# Patient Record
Sex: FEMALE | Race: WHITE | NOT HISPANIC OR LATINO | Employment: FULL TIME | URBAN - NONMETROPOLITAN AREA
[De-identification: names, ages, dates, MRNs, and addresses within clinical notes are randomized per-mention and may not be internally consistent; named-entity substitution may affect disease eponyms.]

---

## 2018-01-23 ENCOUNTER — HISTORICAL (OUTPATIENT)
Dept: ADMINISTRATIVE | Facility: HOSPITAL | Age: 53
End: 2018-01-23

## 2018-04-04 ENCOUNTER — HISTORICAL (OUTPATIENT)
Dept: ADMINISTRATIVE | Facility: HOSPITAL | Age: 53
End: 2018-04-04

## 2018-04-13 ENCOUNTER — HISTORICAL (OUTPATIENT)
Dept: ADMINISTRATIVE | Facility: HOSPITAL | Age: 53
End: 2018-04-13

## 2018-07-11 LAB — HCV AB SER-ACNC: NEGATIVE

## 2018-09-25 ENCOUNTER — HISTORICAL (OUTPATIENT)
Dept: ADMINISTRATIVE | Facility: HOSPITAL | Age: 53
End: 2018-09-25

## 2019-02-26 ENCOUNTER — HISTORICAL (OUTPATIENT)
Dept: ADMINISTRATIVE | Facility: HOSPITAL | Age: 54
End: 2019-02-26

## 2019-02-27 ENCOUNTER — HISTORICAL (OUTPATIENT)
Dept: ADMINISTRATIVE | Facility: HOSPITAL | Age: 54
End: 2019-02-27

## 2019-06-04 ENCOUNTER — HISTORICAL (OUTPATIENT)
Dept: ADMINISTRATIVE | Facility: HOSPITAL | Age: 54
End: 2019-06-04

## 2020-01-23 LAB
INFLUENZA A ANTIGEN, POC: POSITIVE
INFLUENZA B ANTIGEN, POC: NEGATIVE

## 2020-04-16 LAB
BILIRUB SERPL-MCNC: NEGATIVE MG/DL
BLOOD URINE, POC: NORMAL
CLARITY, POC UA: NORMAL
COLOR, POC UA: YELLOW
GLUCOSE UR QL STRIP: NEGATIVE
KETONES UR QL STRIP: NEGATIVE
LEUKOCYTE EST, POC UA: NORMAL
NITRITE, POC UA: NEGATIVE
PH, POC UA: 5.5
PROTEIN, POC: NORMAL
SPECIFIC GRAVITY, POC UA: NORMAL
UROBILINOGEN, POC UA: NORMAL

## 2020-04-30 ENCOUNTER — HISTORICAL (OUTPATIENT)
Dept: ADMINISTRATIVE | Facility: HOSPITAL | Age: 55
End: 2020-04-30

## 2020-04-30 LAB
BILIRUB SERPL-MCNC: NEGATIVE MG/DL
BLOOD URINE, POC: NEGATIVE
CLARITY, POC UA: NORMAL
COLOR, POC UA: YELLOW
GLUCOSE UR QL STRIP: NEGATIVE
KETONES UR QL STRIP: NEGATIVE
LEUKOCYTE EST, POC UA: NEGATIVE
NITRITE, POC UA: NEGATIVE
PH, POC UA: 5
PROTEIN, POC: NEGATIVE
SPECIFIC GRAVITY, POC UA: 1.01
UROBILINOGEN, POC UA: NORMAL

## 2020-06-16 ENCOUNTER — HISTORICAL (OUTPATIENT)
Dept: ADMINISTRATIVE | Facility: HOSPITAL | Age: 55
End: 2020-06-16

## 2020-06-24 ENCOUNTER — HISTORICAL (OUTPATIENT)
Dept: ADMINISTRATIVE | Facility: HOSPITAL | Age: 55
End: 2020-06-24

## 2020-10-06 ENCOUNTER — HISTORICAL (OUTPATIENT)
Dept: ADMINISTRATIVE | Facility: HOSPITAL | Age: 55
End: 2020-10-06

## 2020-10-06 LAB
ALBUMIN SERPL-MCNC: 4.8 G/DL (ref 3.8–4.9)
ALBUMIN/GLOB SERPL: 2 {RATIO} (ref 1.2–2.2)
ALP SERPL-CCNC: 86 IU/L (ref 39–117)
ALT SERPL-CCNC: 16 IU/L (ref 0–32)
AST SERPL-CCNC: 15 IU/L (ref 0–40)
BASOPHILS # BLD AUTO: 0 X10E3/UL (ref 0–0.2)
BASOPHILS NFR BLD AUTO: 1 %
BILIRUB SERPL-MCNC: 0.3 MG/DL (ref 0–1.2)
BUN SERPL-MCNC: 15 MG/DL (ref 6–24)
CALCIUM SERPL-MCNC: 9.6 MG/DL (ref 8.7–10.2)
CHLORIDE SERPL-SCNC: 103 MMOL/L (ref 96–106)
CHOLEST SERPL-MCNC: 174 MG/DL (ref 100–199)
CHOLEST/HDLC SERPL: 2.7 RATIO (ref 0–4.4)
CO2 SERPL-SCNC: 20 MMOL/L (ref 20–29)
CREAT SERPL-MCNC: 0.82 MG/DL (ref 0.57–1)
CREAT/UREA NIT SERPL: 18 (ref 9–23)
DEPRECATED CALCIDIOL+CALCIFEROL SERPL-MC: 47 NG/ML (ref 30–100)
EOSINOPHIL # BLD AUTO: 0.2 X10E3/UL (ref 0–0.4)
EOSINOPHIL NFR BLD AUTO: 3 %
ERYTHROCYTE [DISTWIDTH] IN BLOOD BY AUTOMATED COUNT: 12.8 % (ref 11.7–15.4)
GLOBULIN SER-MCNC: 2.4 G/DL (ref 1.5–4.5)
GLUCOSE SERPL-MCNC: 107 MG/DL (ref 65–99)
HBA1C MFR BLD: 5 % (ref 4.8–5.6)
HCT VFR BLD AUTO: 38 % (ref 34–46.6)
HDLC SERPL-MCNC: 64 MG/DL
HGB BLD-MCNC: 12.2 G/DL (ref 11.1–15.9)
LDLC SERPL CALC-MCNC: 71 MG/DL (ref 0–99)
LYMPHOCYTES # BLD AUTO: 1.3 X10E3/UL (ref 0.7–3.1)
LYMPHOCYTES NFR BLD AUTO: 19 %
MCH RBC QN AUTO: 29 PG (ref 26.6–33)
MCHC RBC AUTO-ENTMCNC: 32.1 G/DL (ref 31.5–35.7)
MCV RBC AUTO: 90 FL (ref 79–97)
MONOCYTES # BLD AUTO: 0.4 X10E3/UL (ref 0.1–0.9)
MONOCYTES NFR BLD AUTO: 5 %
NEUTROPHILS # BLD AUTO: 5.1 X10E3/UL (ref 1.4–7)
NEUTROPHILS NFR BLD AUTO: 72 %
PLATELET # BLD AUTO: 196 X10E3/UL (ref 150–450)
POTASSIUM SERPL-SCNC: 4.8 MMOL/L (ref 3.5–5.2)
PROT SERPL-MCNC: 7.2 G/DL (ref 6–8.5)
RBC # BLD AUTO: 4.21 X10(6)/MCL (ref 3.77–5.28)
SODIUM SERPL-SCNC: 141 MMOL/L (ref 134–144)
TRIGL SERPL-MCNC: 242 MG/DL (ref 0–149)
TSH SERPL-ACNC: 1.19 MIU/ML (ref 0.45–4.5)
VLDLC SERPL CALC-MCNC: 39 MG/DL (ref 5–40)
WBC # SPEC AUTO: 7.1 X10E3/UL (ref 3.4–10.8)

## 2021-04-19 ENCOUNTER — HISTORICAL (OUTPATIENT)
Dept: ADMINISTRATIVE | Facility: HOSPITAL | Age: 56
End: 2021-04-19

## 2021-04-19 LAB
ALBUMIN SERPL-MCNC: 4.3 G/DL (ref 3.8–4.9)
ALBUMIN/GLOB SERPL: 1.7 {RATIO} (ref 1.2–2.2)
ALP SERPL-CCNC: 69 IU/L (ref 39–117)
ALT SERPL-CCNC: 14 IU/L (ref 0–32)
AST SERPL-CCNC: 13 IU/L (ref 0–40)
BASOPHILS # BLD AUTO: 0.1 X10E3/UL (ref 0–0.2)
BASOPHILS NFR BLD AUTO: 1 %
BILIRUB SERPL-MCNC: 0.2 MG/DL (ref 0–1.2)
BUN SERPL-MCNC: 15 MG/DL (ref 6–24)
CALCIUM SERPL-MCNC: 9.4 MG/DL (ref 8.7–10.2)
CHLORIDE SERPL-SCNC: 102 MMOL/L (ref 96–106)
CHOLEST SERPL-MCNC: 162 MG/DL (ref 100–199)
CHOLEST/HDLC SERPL: 2.5 RATIO (ref 0–4.4)
CO2 SERPL-SCNC: 23 MMOL/L (ref 20–29)
CREAT SERPL-MCNC: 0.9 MG/DL (ref 0.57–1)
CREAT/UREA NIT SERPL: 17 (ref 9–23)
DEPRECATED CALCIDIOL+CALCIFEROL SERPL-MC: 37.4 NG/ML (ref 30–100)
EOSINOPHIL # BLD AUTO: 0.2 X10E3/UL (ref 0–0.4)
EOSINOPHIL NFR BLD AUTO: 4 %
ERYTHROCYTE [DISTWIDTH] IN BLOOD BY AUTOMATED COUNT: 13.3 % (ref 11.7–15.4)
GLOBULIN SER-MCNC: 2.6 G/DL (ref 1.5–4.5)
GLUCOSE SERPL-MCNC: 100 MG/DL (ref 65–99)
HBA1C MFR BLD: 5.1 % (ref 4.8–5.6)
HCT VFR BLD AUTO: 37.8 % (ref 34–46.6)
HDLC SERPL-MCNC: 64 MG/DL
HGB BLD-MCNC: 12.3 G/DL (ref 11.1–15.9)
LDLC SERPL CALC-MCNC: 62 MG/DL (ref 0–99)
LYMPHOCYTES # BLD AUTO: 1.7 X10E3/UL (ref 0.7–3.1)
LYMPHOCYTES NFR BLD AUTO: 32 %
MCH RBC QN AUTO: 29 PG (ref 26.6–33)
MCHC RBC AUTO-ENTMCNC: 32.5 G/DL (ref 31.5–35.7)
MCV RBC AUTO: 89 FL (ref 79–97)
MONOCYTES # BLD AUTO: 0.3 X10E3/UL (ref 0.1–0.9)
MONOCYTES NFR BLD AUTO: 5 %
NEUTROPHILS # BLD AUTO: 3.1 X10E3/UL (ref 1.4–7)
NEUTROPHILS NFR BLD AUTO: 58 %
PLATELET # BLD AUTO: 247 X10E3/UL (ref 150–450)
POTASSIUM SERPL-SCNC: 4.4 MMOL/L (ref 3.5–5.2)
PROT SERPL-MCNC: 6.9 G/DL (ref 6–8.5)
RBC # BLD AUTO: 4.24 X10(6)/MCL (ref 3.77–5.28)
SODIUM SERPL-SCNC: 141 MMOL/L (ref 134–144)
TRIGL SERPL-MCNC: 223 MG/DL (ref 0–149)
TSH SERPL-ACNC: 4.55 MIU/ML (ref 0.45–4.5)
VLDLC SERPL CALC-MCNC: 36 MG/DL (ref 5–40)
WBC # SPEC AUTO: 5.4 X10E3/UL (ref 3.4–10.8)

## 2021-04-29 LAB
BILIRUB SERPL-MCNC: NEGATIVE MG/DL
BLOOD URINE, POC: NEGATIVE
CLARITY, POC UA: NORMAL
COLOR, POC UA: YELLOW
GLUCOSE UR QL STRIP: NEGATIVE
KETONES UR QL STRIP: NEGATIVE
LEUKOCYTE EST, POC UA: NEGATIVE
NITRITE, POC UA: NEGATIVE
PH, POC UA: 5.5
PROTEIN, POC: NEGATIVE
SPECIFIC GRAVITY, POC UA: 1.02
UROBILINOGEN, POC UA: NORMAL

## 2021-06-21 ENCOUNTER — HISTORICAL (OUTPATIENT)
Dept: ADMINISTRATIVE | Facility: HOSPITAL | Age: 56
End: 2021-06-21

## 2021-06-21 LAB — TSH SERPL-ACNC: 0.94 MIU/ML (ref 0.45–4.5)

## 2021-06-30 ENCOUNTER — HISTORICAL (OUTPATIENT)
Dept: ADMINISTRATIVE | Facility: HOSPITAL | Age: 56
End: 2021-06-30

## 2021-09-21 ENCOUNTER — HISTORICAL (OUTPATIENT)
Dept: ADMINISTRATIVE | Facility: HOSPITAL | Age: 56
End: 2021-09-21

## 2021-09-21 LAB
ALBUMIN SERPL-MCNC: 4.5 G/DL (ref 3.8–4.9)
ALBUMIN/GLOB SERPL: 1.7 {RATIO} (ref 1.2–2.2)
ALP SERPL-CCNC: 109 IU/L (ref 44–121)
ALT SERPL-CCNC: 24 IU/L (ref 0–32)
AST SERPL-CCNC: 25 IU/L (ref 0–40)
BASOPHILS # BLD AUTO: 0 X10E3/UL (ref 0–0.2)
BASOPHILS NFR BLD AUTO: 1 %
BILIRUB SERPL-MCNC: 0.3 MG/DL (ref 0–1.2)
BUN SERPL-MCNC: 9 MG/DL (ref 6–24)
CALCIUM SERPL-MCNC: 9.4 MG/DL (ref 8.7–10.2)
CHLORIDE SERPL-SCNC: 98 MMOL/L (ref 96–106)
CHOLEST SERPL-MCNC: 166 MG/DL (ref 100–199)
CHOLEST/HDLC SERPL: 4 RATIO (ref 0–4.4)
CO2 SERPL-SCNC: 24 MMOL/L (ref 20–29)
CREAT SERPL-MCNC: 0.89 MG/DL (ref 0.57–1)
CREAT/UREA NIT SERPL: 10 (ref 9–23)
DEPRECATED CALCIDIOL+CALCIFEROL SERPL-MC: 37.9 NG/ML (ref 30–100)
EOSINOPHIL # BLD AUTO: 0.1 X10E3/UL (ref 0–0.4)
EOSINOPHIL NFR BLD AUTO: 2 %
ERYTHROCYTE [DISTWIDTH] IN BLOOD BY AUTOMATED COUNT: 12.5 % (ref 11.7–15.4)
GLOBULIN SER-MCNC: 2.6 G/DL (ref 1.5–4.5)
GLUCOSE SERPL-MCNC: 119 MG/DL (ref 65–99)
HCT VFR BLD AUTO: 39.4 % (ref 34–46.6)
HDLC SERPL-MCNC: 41 MG/DL
HGB BLD-MCNC: 13.3 G/DL (ref 11.1–15.9)
LDLC SERPL CALC-MCNC: 92 MG/DL (ref 0–99)
LYMPHOCYTES # BLD AUTO: 1.1 X10E3/UL (ref 0.7–3.1)
LYMPHOCYTES NFR BLD AUTO: 25 %
MCH RBC QN AUTO: 27.9 PG (ref 26.6–33)
MCHC RBC AUTO-ENTMCNC: 33.8 G/DL (ref 31.5–35.7)
MCV RBC AUTO: 83 FL (ref 79–97)
MONOCYTES # BLD AUTO: 0.4 X10E3/UL (ref 0.1–0.9)
MONOCYTES NFR BLD AUTO: 8 %
NEUTROPHILS # BLD AUTO: 3 X10E3/UL (ref 1.4–7)
NEUTROPHILS NFR BLD AUTO: 64 %
PLATELET # BLD AUTO: 235 X10E3/UL (ref 150–450)
POTASSIUM SERPL-SCNC: 4.1 MMOL/L (ref 3.5–5.2)
PROT SERPL-MCNC: 7.1 G/DL (ref 6–8.5)
RBC # BLD AUTO: 4.77 X10(6)/MCL (ref 3.77–5.28)
SODIUM SERPL-SCNC: 137 MMOL/L (ref 134–144)
TRIGL SERPL-MCNC: 191 MG/DL (ref 0–149)
TSH SERPL-ACNC: 0.97 MIU/ML (ref 0.45–4.5)
VLDLC SERPL CALC-MCNC: 33 MG/DL (ref 5–40)
WBC # SPEC AUTO: 4.6 X10E3/UL (ref 3.4–10.8)

## 2021-12-03 ENCOUNTER — HISTORICAL (OUTPATIENT)
Dept: ADMINISTRATIVE | Facility: HOSPITAL | Age: 56
End: 2021-12-03

## 2022-02-09 LAB
BASOPHILS # BLD AUTO: 0 10*3/UL (ref 0–0.2)
BASOPHILS NFR BLD AUTO: 0 %
CHOLEST SERPL-MCNC: 224 MG/DL (ref 100–199)
CHOLEST/HDLC SERPL: 3.2 {RATIO} (ref 0–4.4)
DEPRECATED CALCIDIOL+CALCIFEROL SERPL-MC: 20.1 NG/ML (ref 30–100)
EOSINOPHIL # BLD AUTO: 0.1 10*3/UL (ref 0–0.4)
EOSINOPHIL NFR BLD AUTO: 3 %
ERYTHROCYTE [DISTWIDTH] IN BLOOD BY AUTOMATED COUNT: 13.2 % (ref 11.7–15.4)
HBA1C MFR BLD: 5.7 % (ref 4.8–5.6)
HCT VFR BLD AUTO: 36.6 % (ref 34–46.6)
HDLC SERPL-MCNC: 69 MG/DL
HGB BLD-MCNC: 11.9 G/DL (ref 11.1–15.9)
INSULIN SERPL-MCNC: 30.1 UG/L (ref 2.6–24.9)
IRON SERPL-MCNC: 67 UG/DL (ref 27–159)
LDLC SERPL CALC-MCNC: 121 MG/DL (ref 0–99)
LYMPHOCYTES # BLD AUTO: 1.5 10*3/UL (ref 0.7–3.1)
LYMPHOCYTES NFR BLD AUTO: 32 %
MCH RBC QN AUTO: 28.1 PG (ref 26.6–33)
MCHC RBC AUTO-ENTMCNC: 32.5 G/DL (ref 31.5–35.7)
MCV RBC AUTO: 86 FL (ref 79–97)
MONOCYTES # BLD AUTO: 0.3 10*3/UL (ref 0.1–0.9)
MONOCYTES NFR BLD AUTO: 7 %
NEUTROPHILS # BLD AUTO: 2.7 10*3/UL (ref 1.4–7)
NEUTROPHILS NFR BLD AUTO: 58 %
PLATELET # BLD AUTO: 257 10*3/UL (ref 150–450)
RBC # BLD AUTO: 4.24 10*6/UL (ref 3.77–5.28)
TRIGL SERPL-MCNC: 199 MG/DL (ref 0–149)
TSH SERPL-ACNC: 0.73 M[IU]/L (ref 0.45–4.5)
VLDLC SERPL CALC-MCNC: 34 MG/DL (ref 5–40)
WBC # SPEC AUTO: 4.6 10*3/UL (ref 3.4–10.8)

## 2022-03-24 ENCOUNTER — HISTORICAL (OUTPATIENT)
Dept: ADMINISTRATIVE | Facility: HOSPITAL | Age: 57
End: 2022-03-24

## 2022-03-29 ENCOUNTER — HISTORICAL (OUTPATIENT)
Dept: ADMINISTRATIVE | Facility: HOSPITAL | Age: 57
End: 2022-03-29

## 2022-04-10 ENCOUNTER — HISTORICAL (OUTPATIENT)
Dept: ADMINISTRATIVE | Facility: HOSPITAL | Age: 57
End: 2022-04-10

## 2022-04-28 VITALS
BODY MASS INDEX: 42.09 KG/M2 | WEIGHT: 252.63 LBS | DIASTOLIC BLOOD PRESSURE: 60 MMHG | SYSTOLIC BLOOD PRESSURE: 132 MMHG | OXYGEN SATURATION: 99 % | HEIGHT: 65 IN

## 2022-05-14 ENCOUNTER — HISTORICAL (OUTPATIENT)
Dept: ADMINISTRATIVE | Facility: HOSPITAL | Age: 57
End: 2022-05-14

## 2022-09-15 ENCOUNTER — HISTORICAL (OUTPATIENT)
Dept: ADMINISTRATIVE | Facility: HOSPITAL | Age: 57
End: 2022-09-15

## 2022-09-15 LAB — BCS RECOMMENDATION EXT: NORMAL

## 2022-09-21 ENCOUNTER — HISTORICAL (OUTPATIENT)
Dept: ADMINISTRATIVE | Facility: HOSPITAL | Age: 57
End: 2022-09-21

## 2023-01-11 ENCOUNTER — DOCUMENTATION ONLY (OUTPATIENT)
Dept: ADMINISTRATIVE | Facility: HOSPITAL | Age: 58
End: 2023-01-11

## 2023-01-25 ENCOUNTER — OFFICE VISIT (OUTPATIENT)
Dept: BEHAVIORAL HEALTH | Facility: CLINIC | Age: 58
End: 2023-01-25

## 2023-01-25 VITALS
DIASTOLIC BLOOD PRESSURE: 68 MMHG | WEIGHT: 215 LBS | HEART RATE: 91 BPM | TEMPERATURE: 97 F | SYSTOLIC BLOOD PRESSURE: 116 MMHG | OXYGEN SATURATION: 95 % | HEIGHT: 64 IN | BODY MASS INDEX: 36.7 KG/M2

## 2023-01-25 VITALS
SYSTOLIC BLOOD PRESSURE: 158 MMHG | BODY MASS INDEX: 39.41 KG/M2 | DIASTOLIC BLOOD PRESSURE: 88 MMHG | HEIGHT: 65 IN | WEIGHT: 236.56 LBS | OXYGEN SATURATION: 96 % | HEART RATE: 82 BPM | TEMPERATURE: 97 F

## 2023-01-25 DIAGNOSIS — F95.8 PHONIC TIC: Chronic | ICD-10-CM

## 2023-01-25 DIAGNOSIS — G25.81 RESTLESS LEG SYNDROME: ICD-10-CM

## 2023-01-25 DIAGNOSIS — F41.1 GENERALIZED ANXIETY DISORDER: Chronic | ICD-10-CM

## 2023-01-25 DIAGNOSIS — F33.1 DEPRESSION, MAJOR, RECURRENT, MODERATE: Primary | ICD-10-CM

## 2023-01-25 DIAGNOSIS — G47.00 INSOMNIA, UNSPECIFIED TYPE: Chronic | ICD-10-CM

## 2023-01-25 PROCEDURE — 99214 PR OFFICE/OUTPT VISIT, EST, LEVL IV, 30-39 MIN: ICD-10-PCS | Mod: ,,, | Performed by: NURSE PRACTITIONER

## 2023-01-25 PROCEDURE — 99214 OFFICE O/P EST MOD 30 MIN: CPT | Mod: ,,, | Performed by: NURSE PRACTITIONER

## 2023-01-25 RX ORDER — METFORMIN HYDROCHLORIDE EXTENDED-RELEASE TABLETS 500 MG/1
500 TABLET, FILM COATED, EXTENDED RELEASE ORAL
COMMUNITY
Start: 2022-11-23 | End: 2023-03-28

## 2023-01-25 RX ORDER — MOMETASONE FUROATE 1 MG/G
1 CREAM TOPICAL 2 TIMES DAILY PRN
COMMUNITY
Start: 2022-11-22

## 2023-01-25 RX ORDER — CLONIDINE HYDROCHLORIDE 0.1 MG/1
0.1 TABLET ORAL NIGHTLY
COMMUNITY
Start: 2023-01-17 | End: 2023-01-25 | Stop reason: SDUPTHER

## 2023-01-25 RX ORDER — GABAPENTIN 600 MG/1
600 TABLET ORAL 3 TIMES DAILY
COMMUNITY
Start: 2022-12-23 | End: 2023-02-07

## 2023-01-25 RX ORDER — VORTIOXETINE 20 MG/1
1 TABLET, FILM COATED ORAL DAILY
Qty: 30 TABLET | Refills: 2 | Status: SHIPPED | OUTPATIENT
Start: 2023-01-25 | End: 2023-03-30 | Stop reason: SDUPTHER

## 2023-01-25 RX ORDER — ROPINIROLE 2 MG/1
2 TABLET, FILM COATED ORAL NIGHTLY
Qty: 30 TABLET | Refills: 2 | Status: SHIPPED | OUTPATIENT
Start: 2023-01-25 | End: 2023-09-06

## 2023-01-25 RX ORDER — CLONAZEPAM 0.5 MG/1
0.5 TABLET ORAL 3 TIMES DAILY PRN
Qty: 90 TABLET | Refills: 1 | Status: SHIPPED | OUTPATIENT
Start: 2023-01-25 | End: 2023-03-30 | Stop reason: SDUPTHER

## 2023-01-25 RX ORDER — CLONAZEPAM 0.5 MG/1
0.5 TABLET ORAL 3 TIMES DAILY PRN
COMMUNITY
Start: 2022-11-14 | End: 2023-01-25 | Stop reason: SDUPTHER

## 2023-01-25 RX ORDER — MELOXICAM 15 MG/1
15 TABLET ORAL DAILY
COMMUNITY
Start: 2022-12-14 | End: 2023-08-21

## 2023-01-25 RX ORDER — CLONIDINE HYDROCHLORIDE 0.1 MG/1
0.1 TABLET ORAL NIGHTLY
Qty: 30 TABLET | Refills: 2 | Status: SHIPPED | OUTPATIENT
Start: 2023-01-25

## 2023-01-25 RX ORDER — TRAMADOL HYDROCHLORIDE 50 MG/1
50 TABLET ORAL 2 TIMES DAILY PRN
COMMUNITY
Start: 2022-12-06

## 2023-01-25 RX ORDER — MONTELUKAST SODIUM 10 MG/1
10 TABLET ORAL NIGHTLY
COMMUNITY
Start: 2022-11-30 | End: 2023-02-22

## 2023-01-25 RX ORDER — TRAZODONE HYDROCHLORIDE 150 MG/1
300 TABLET ORAL NIGHTLY
Qty: 60 TABLET | Refills: 2 | Status: SHIPPED | OUTPATIENT
Start: 2023-01-25 | End: 2023-03-30 | Stop reason: SDUPTHER

## 2023-01-25 RX ORDER — MODAFINIL 200 MG/1
1.5 TABLET ORAL DAILY
COMMUNITY
Start: 2022-09-15 | End: 2023-03-30 | Stop reason: SDUPTHER

## 2023-01-25 RX ORDER — TRAZODONE HYDROCHLORIDE 150 MG/1
300 TABLET ORAL NIGHTLY
COMMUNITY
Start: 2022-12-27 | End: 2023-01-25 | Stop reason: SDUPTHER

## 2023-01-25 RX ORDER — TRIAMCINOLONE ACETONIDE 1 MG/G
1 PASTE DENTAL 2 TIMES DAILY PRN
COMMUNITY
Start: 2022-12-09 | End: 2023-03-28

## 2023-01-25 RX ORDER — LEVOTHYROXINE SODIUM 112 UG/1
112 TABLET ORAL EVERY MORNING
COMMUNITY
Start: 2022-12-08 | End: 2023-02-27

## 2023-01-25 RX ORDER — MUPIROCIN 20 MG/G
1 OINTMENT TOPICAL 3 TIMES DAILY
COMMUNITY
Start: 2022-10-06

## 2023-01-25 RX ORDER — ERGOCALCIFEROL 1.25 MG/1
50000 CAPSULE ORAL
COMMUNITY
Start: 2022-11-25 | End: 2023-02-22

## 2023-01-25 RX ORDER — OXYCODONE AND ACETAMINOPHEN 7.5; 325 MG/1; MG/1
1 TABLET ORAL 4 TIMES DAILY PRN
COMMUNITY
Start: 2023-01-17 | End: 2023-07-06

## 2023-01-25 RX ORDER — METAXALONE 800 MG/1
800 TABLET ORAL 3 TIMES DAILY
COMMUNITY
Start: 2023-01-03

## 2023-01-25 RX ORDER — ROPINIROLE 2 MG/1
2 TABLET, FILM COATED ORAL NIGHTLY
COMMUNITY
Start: 2023-01-03 | End: 2023-01-25 | Stop reason: SDUPTHER

## 2023-01-25 RX ORDER — VORTIOXETINE 20 MG/1
1 TABLET, FILM COATED ORAL DAILY
COMMUNITY
Start: 2023-01-17 | End: 2023-01-25 | Stop reason: SDUPTHER

## 2023-01-25 RX ORDER — HYDROGEN PEROXIDE 3 %
20 SOLUTION, NON-ORAL MISCELLANEOUS DAILY
COMMUNITY
Start: 2022-12-29 | End: 2023-02-27

## 2023-01-25 RX ORDER — TIRZEPATIDE 15 MG/.5ML
15 INJECTION, SOLUTION SUBCUTANEOUS WEEKLY
COMMUNITY
Start: 2022-12-19 | End: 2023-03-28 | Stop reason: SDUPTHER

## 2023-01-25 RX ORDER — TRIAMCINOLONE ACETONIDE 1 MG/G
1 OINTMENT TOPICAL 2 TIMES DAILY
COMMUNITY
Start: 2022-11-03 | End: 2023-07-06

## 2023-01-25 RX ORDER — ESTRADIOL 1 MG/1
1 TABLET ORAL DAILY
COMMUNITY
Start: 2022-11-10 | End: 2023-07-06

## 2023-01-25 RX ORDER — CHOLESTYRAMINE 4 G/9G
1 POWDER, FOR SUSPENSION ORAL 2 TIMES DAILY
COMMUNITY
Start: 2023-01-10

## 2023-01-25 NOTE — PROGRESS NOTES
"Chief Complaint   Patient presents with    phonic tic     "It is worse with inc. Anxiety."       History of Present Illness  57-year-old female with history of MDD, GA D, insomnia, and phonic tics seen there for follow-up appointment and medication management.  Reports that since the holiday season she has noticed an increase in her chronic tics.  This directly to an increase in her situational anxiety.  This had a good holiday season with her family.  Her mother visited her sister's for 2 weeks and patient did enjoy that were brief.  She also reports her sleep is reasonably good fractured and lower quality due to a leaking CPAP mask.  She is also continuing to deal with chronic pain issues.  Denies side effects from current medications.  Reports some bad days per reports her mood is good overall most days of the week.  She has not been taking her 3rd daily allotted dose of clonazepam.  Encouraged her to do so as she stated she felt relief in her phonic tics after the other 2 daily doses.  Denies any excessive sedation, dizziness, or syncopal episodes.   checked.  No suspected diversion or abuse.  Adequate denies any thoughts of self-harm.  His function well for filling all of her possibilities at home and at work.  No recent panic attacks. Patient denies SI/HI. Denies hallucinations and does not appear to be responding to internal stimuli or be internally preoccupied. No manic symptoms noted.       Objective:     Vitals:  Vitals:    01/25/23 1423   BP: 116/68   BP Location: Left arm   Pulse: 91   Temp: 96.6 °F (35.9 °C)   TempSrc: Temporal   SpO2: 95%   Weight: 97.5 kg (215 lb)   Height: 5' 4" (1.626 m)       Medication:    Current Outpatient Medications:     cholestyramine (QUESTRAN) 4 gram packet, Take 1 packet by mouth 2 (two) times a day., Disp: , Rfl:     esomeprazole (NEXIUM) 20 MG capsule, Take 20 mg by mouth once daily., Disp: , Rfl:     estradioL (ESTRACE) 1 MG tablet, Take 1 mg by mouth once daily., " Disp: , Rfl:     gabapentin (NEURONTIN) 600 MG tablet, Take 600 mg by mouth 3 (three) times daily., Disp: , Rfl:     levothyroxine (SYNTHROID) 112 MCG tablet, Take 112 mcg by mouth every morning., Disp: , Rfl:     meloxicam (MOBIC) 15 MG tablet, Take 15 mg by mouth once daily., Disp: , Rfl:     metaxalone (SKELAXIN) 800 MG tablet, Take 800 mg by mouth 3 (three) times daily., Disp: , Rfl:     metFORMIN (FORTAMET) 500 mg 24hr tablet, Take 500 mg by mouth before dinner., Disp: , Rfl:     modafiniL (PROVIGIL) 200 MG Tab, Take 1.5 tablets by mouth once daily., Disp: , Rfl:     mometasone 0.1% (ELOCON) 0.1 % cream, Apply 1 application topically 2 (two) times daily as needed., Disp: , Rfl:     montelukast (SINGULAIR) 10 mg tablet, Take 10 mg by mouth every evening., Disp: , Rfl:     mupirocin (BACTROBAN) 2 % ointment, Apply 1 g topically 3 (three) times daily., Disp: , Rfl:     oxyCODONE-acetaminophen (PERCOCET) 7.5-325 mg per tablet, Take 1 tablet by mouth 4 (four) times daily as needed., Disp: , Rfl:     tirzepatide (MOUNJARO) 15 mg/0.5 mL PnIj, Inject 15 mg into the skin once a week., Disp: , Rfl:     traMADoL (ULTRAM) 50 mg tablet, Take 50 mg by mouth 2 (two) times daily as needed., Disp: , Rfl:     triamcinolone acetonide 0.1% (KENALOG) 0.1 % ointment, Apply 1 application topically 2 (two) times daily., Disp: , Rfl:     triamcinolone acetonide 0.1% (KENALOG) 0.1 % paste, Place 1 application onto teeth 2 (two) times daily as needed., Disp: , Rfl:     VITAMIN D2 1,250 mcg (50,000 unit) capsule, Take 50,000 Units by mouth every 7 days., Disp: , Rfl:     clonazePAM (KLONOPIN) 0.5 MG tablet, Take 1 tablet (0.5 mg total) by mouth 3 (three) times daily as needed for Anxiety., Disp: 90 tablet, Rfl: 1    cloNIDine (CATAPRES) 0.1 MG tablet, Take 1 tablet (0.1 mg total) by mouth every evening., Disp: 30 tablet, Rfl: 2    rOPINIRole (REQUIP) 2 MG tablet, Take 1 tablet (2 mg total) by mouth every evening., Disp: 30 tablet, Rfl:  "2    traZODone (DESYREL) 150 MG tablet, Take 2 tablets (300 mg total) by mouth every evening., Disp: 60 tablet, Rfl: 2    TRINTELLIX 20 mg Tab, Take 1 tablet (20 mg total) by mouth once daily., Disp: 30 tablet, Rfl: 2       Significant Labs: - none at this time    Significant Imaging: - none at this time    Physical Exam  Vitals and nursing note reviewed.   Constitutional:       General: She is awake.      Appearance: Normal appearance.   Musculoskeletal:      Comments: Full ROM   Neurological:      Mental Status: She is alert.   Psychiatric:         Attention and Perception: Attention and perception normal. She does not perceive auditory or visual hallucinations.         Mood and Affect: Affect normal.         Speech: Speech normal.         Behavior: Behavior is cooperative.         Thought Content: Thought content does not include homicidal or suicidal ideation.         Cognition and Memory: Cognition and memory normal.        Review of Systems     Mental Status Exam:  Presentation:  - Appearance: 57 y.o. year old female, appears stated age, appears Casually dressed and Well groomed  - Motility: Erect when standing, No EPS or Tremors, No psychomotor agitation or retardation appreciated  - Behavior: calm, cooperative, maintains eye contact, friendly  Speech:  - Character/Organization: spontaneous, fluent, normal volume, normal rate, normal rhythm  Emotional State:  - Mood: "anxious"  - Affect: congruent appropriate  Thought:  - Process: logical, linear, organized , goal-directed  - Preoccupations: no ruminations, rituals, or phobias appreciated  - Delusions: no persecutory, paranoid, or grandiose delusions appreciated  - Perception: denies AVH, not actively responding to internal stimuli  - SI/HI: denies/denies  Sensorium & Intellect:  - Sensorium: person, place, situation, time/date  - Memory: intact to recent and remote events  - Attention/Concentration: good  - Insight/Judgement: good    Gait: normal swing and " stance  MSK:no rigidity appreciated    All other systems without acute issues unless noted in HP      Assessment/Plan      ICD-10-CM ICD-9-CM    1. Depression, major, recurrent, moderate  F33.1 296.32 TRINTELLIX 20 mg Tab      2. Generalized anxiety disorder  F41.1 300.02 clonazePAM (KLONOPIN) 0.5 MG tablet      3. Insomnia, unspecified type  G47.00 780.52 cloNIDine (CATAPRES) 0.1 MG tablet      traZODone (DESYREL) 150 MG tablet      4. Phonic tic  F95.8 307.20       5. Restless leg syndrome  G25.81 333.94 rOPINIRole (REQUIP) 2 MG tablet         Continue current medications without change    Reviewed non-pharmacologic coping skills to decrease anxiety, such as deep breathing, journaling, exercise, recognizing triggers, meditation, healthy diet and exercise, routine sleep schedule, negative thought recognition, time for hobbies/interests, relaxation techniques, avoidance of substance abuse, limiting caffeine, benefits of counseling, and biofeedback. Patient verbalized understanding.    Continue counseling sessions with established provider.     checked. Results as expected. No suspected diversion or abuse of controlled substances.    Sleep hygiene should include:     regular sleep schedule  Optimal sleep environment (dark room, lights out, no TV or Radio)  Relaxing pre-sleep ritual   Avoid day time naps  No caffeine after noon  No excess alcohol intake  No Tobacco before bed time  Regular daily exercise    Return to clinic in 2 months or sooner if needed    JERSON Hunter

## 2023-02-07 DIAGNOSIS — M79.2 NERVE PAIN: Primary | ICD-10-CM

## 2023-02-07 RX ORDER — GABAPENTIN 600 MG/1
TABLET ORAL
Qty: 90 TABLET | Refills: 2 | Status: SHIPPED | OUTPATIENT
Start: 2023-02-07 | End: 2023-02-08 | Stop reason: SDUPTHER

## 2023-02-08 RX ORDER — GABAPENTIN 600 MG/1
TABLET ORAL
Qty: 90 TABLET | Refills: 0 | Status: SHIPPED | OUTPATIENT
Start: 2023-02-08 | End: 2023-07-06

## 2023-02-08 NOTE — TELEPHONE ENCOUNTER
----- Message from Landy Gonzalez sent at 2/8/2023  1:24 PM CST -----  Regarding: Med refill      Pt called to get a refill on her Gabapentin 600mg.  She is seeing a neck doc in [place] until today (she is leaving that doc today since she is not happy with her treatment and she knows once she does that he will not refill that med for her).  She is changing to a doctor in [place] and just needs this refill to carry her over until she can see the new doc.      [address]

## 2023-02-17 ENCOUNTER — TELEPHONE (OUTPATIENT)
Dept: BEHAVIORAL HEALTH | Facility: CLINIC | Age: 58
End: 2023-02-17

## 2023-02-17 DIAGNOSIS — F41.9 ANXIETY: Primary | ICD-10-CM

## 2023-02-17 RX ORDER — HYDROXYZINE PAMOATE 25 MG/1
25 CAPSULE ORAL 3 TIMES DAILY PRN
Qty: 30 CAPSULE | Refills: 0 | Status: SHIPPED | OUTPATIENT
Start: 2023-02-17 | End: 2023-02-27

## 2023-02-17 NOTE — TELEPHONE ENCOUNTER
Pt would like to know if you would consider short term script of vistaril to aid in her anxiety  for the family vacation coming up.

## 2023-02-17 NOTE — TELEPHONE ENCOUNTER
"----- Message from JERSON Sky sent at 2/16/2023 10:44 AM CST -----  Regarding: RE: trip coming up  Pt already receives PRN medication for her anxiety. She may also use the coping skills she has previously developed to cope with her anxiety.   ----- Message -----  From: Mary Pinto LPN  Sent: 2/15/2023   2:34 PM CST  To: JERSON Sky  Subject: trip coming up                                   Pt expresses inc anxiety r/t throat noises that will cause an upcoming trip to be "miserable for her." She would like to know if there is any way you can help her anxiety decrease.    The trip is on Feb. 24- Mar. 1.    [phone]    [facility]      "

## 2023-03-28 ENCOUNTER — OFFICE VISIT (OUTPATIENT)
Dept: FAMILY MEDICINE | Facility: CLINIC | Age: 58
End: 2023-03-28
Payer: COMMERCIAL

## 2023-03-28 VITALS
HEART RATE: 93 BPM | TEMPERATURE: 96 F | WEIGHT: 205.63 LBS | SYSTOLIC BLOOD PRESSURE: 128 MMHG | DIASTOLIC BLOOD PRESSURE: 68 MMHG | OXYGEN SATURATION: 98 % | HEIGHT: 64 IN | BODY MASS INDEX: 35.1 KG/M2 | RESPIRATION RATE: 18 BRPM

## 2023-03-28 DIAGNOSIS — J45.909 ASTHMA, UNSPECIFIED ASTHMA SEVERITY, UNSPECIFIED WHETHER COMPLICATED, UNSPECIFIED WHETHER PERSISTENT: ICD-10-CM

## 2023-03-28 DIAGNOSIS — E03.9 ACQUIRED HYPOTHYROIDISM: ICD-10-CM

## 2023-03-28 DIAGNOSIS — E11.9 CONTROLLED TYPE 2 DIABETES MELLITUS WITHOUT COMPLICATION, WITHOUT LONG-TERM CURRENT USE OF INSULIN: Primary | ICD-10-CM

## 2023-03-28 DIAGNOSIS — G47.33 OBSTRUCTIVE SLEEP APNEA: ICD-10-CM

## 2023-03-28 PROCEDURE — 1160F PR REVIEW ALL MEDS BY PRESCRIBER/CLIN PHARMACIST DOCUMENTED: ICD-10-PCS | Mod: CPTII,,, | Performed by: NURSE PRACTITIONER

## 2023-03-28 PROCEDURE — 99212 PR OFFICE/OUTPT VISIT, EST, LEVL II, 10-19 MIN: ICD-10-PCS | Mod: ,,, | Performed by: NURSE PRACTITIONER

## 2023-03-28 PROCEDURE — 1159F PR MEDICATION LIST DOCUMENTED IN MEDICAL RECORD: ICD-10-PCS | Mod: CPTII,,, | Performed by: NURSE PRACTITIONER

## 2023-03-28 PROCEDURE — 99212 OFFICE O/P EST SF 10 MIN: CPT | Mod: ,,, | Performed by: NURSE PRACTITIONER

## 2023-03-28 PROCEDURE — 3074F PR MOST RECENT SYSTOLIC BLOOD PRESSURE < 130 MM HG: ICD-10-PCS | Mod: CPTII,,, | Performed by: NURSE PRACTITIONER

## 2023-03-28 PROCEDURE — 3008F BODY MASS INDEX DOCD: CPT | Mod: CPTII,,, | Performed by: NURSE PRACTITIONER

## 2023-03-28 PROCEDURE — 3078F DIAST BP <80 MM HG: CPT | Mod: CPTII,,, | Performed by: NURSE PRACTITIONER

## 2023-03-28 PROCEDURE — 1159F MED LIST DOCD IN RCRD: CPT | Mod: CPTII,,, | Performed by: NURSE PRACTITIONER

## 2023-03-28 PROCEDURE — 3074F SYST BP LT 130 MM HG: CPT | Mod: CPTII,,, | Performed by: NURSE PRACTITIONER

## 2023-03-28 PROCEDURE — 3008F PR BODY MASS INDEX (BMI) DOCUMENTED: ICD-10-PCS | Mod: CPTII,,, | Performed by: NURSE PRACTITIONER

## 2023-03-28 PROCEDURE — 1160F RVW MEDS BY RX/DR IN RCRD: CPT | Mod: CPTII,,, | Performed by: NURSE PRACTITIONER

## 2023-03-28 PROCEDURE — 3078F PR MOST RECENT DIASTOLIC BLOOD PRESSURE < 80 MM HG: ICD-10-PCS | Mod: CPTII,,, | Performed by: NURSE PRACTITIONER

## 2023-03-28 RX ORDER — MONTELUKAST SODIUM 10 MG/1
TABLET ORAL
Qty: 30 TABLET | Refills: 0 | Status: SHIPPED | OUTPATIENT
Start: 2023-03-28 | End: 2023-08-16

## 2023-03-28 RX ORDER — CYCLOBENZAPRINE HCL 10 MG
10 TABLET ORAL DAILY PRN
COMMUNITY
Start: 2023-02-08

## 2023-03-28 RX ORDER — TIRZEPATIDE 15 MG/.5ML
15 INJECTION, SOLUTION SUBCUTANEOUS WEEKLY
Qty: 1 PEN | Refills: 5 | Status: SHIPPED | OUTPATIENT
Start: 2023-03-28 | End: 2023-09-11

## 2023-03-28 NOTE — PROGRESS NOTES
"Subjective:       Patient ID: Brenda Humphreys is a 58 y.o. female.    Chief Complaint: 3 month f/u, bloodwork, and Back Pain (Middle thoracic)    Follow up with medication and chronic conditions. Wearing CPAP every night but still fighting to keep it on, mouth very dry in am. Cervical injection with cervical improved but now thoracic discomfort. Worse at bra strap. Tried fried food and epigastric discomfort. Taking flexeril for thoracic muscle tightness. Gabapentin, vistaril together helps everything except thoracic area. Also clearing throat often and working to stop sound of clearing throat, worse when under stress.      Review of Systems   Constitutional:  Positive for appetite change and fatigue.   HENT:  Positive for mouth dryness, postnasal drip and rhinorrhea.    Respiratory:  Positive for cough. Negative for chest tightness and wheezing.    Cardiovascular:  Negative for chest pain and leg swelling.   Gastrointestinal:  Positive for abdominal distention and vomiting. Negative for reflux.   Endocrine: Positive for polydipsia. Negative for cold intolerance and heat intolerance.   Allergic/Immunologic: Positive for environmental allergies.   Neurological:  Positive for numbness and headaches. Negative for vertigo.   Hematological:  Negative for adenopathy.   Psychiatric/Behavioral:  Positive for sleep disturbance. Negative for decreased concentration. The patient is nervous/anxious.        Objective:      Vitals:    03/28/23 1302   BP: 128/68   Pulse: 93   Resp: 18   Temp: 96.4 °F (35.8 °C)   TempSrc: Temporal   SpO2: 98%   Weight: 93.3 kg (205 lb 9.6 oz)   Height: 5' 4" (1.626 m)       Physical Exam  Constitutional:       Appearance: Normal appearance.   HENT:      Head: Normocephalic.      Right Ear: Tympanic membrane normal.      Left Ear: Tympanic membrane normal.      Nose: Nose normal.      Mouth/Throat:      Mouth: Mucous membranes are dry.      Pharynx: Oropharynx is clear.   Eyes:      Extraocular " Movements: Extraocular movements intact.   Cardiovascular:      Rate and Rhythm: Normal rate and regular rhythm.      Pulses: Normal pulses.   Pulmonary:      Effort: Pulmonary effort is normal.      Breath sounds: Normal breath sounds.   Abdominal:      General: Abdomen is flat. Bowel sounds are normal.      Palpations: Abdomen is soft.   Musculoskeletal:         General: Normal range of motion.      Cervical back: Normal range of motion.   Skin:     General: Skin is warm and dry.      Capillary Refill: Capillary refill takes less than 2 seconds.   Neurological:      General: No focal deficit present.      Mental Status: She is alert.   Psychiatric:         Mood and Affect: Mood normal.       Assessment/Plan:     1. Controlled type 2 diabetes mellitus without complication, without long-term current use of insulin  -     tirzepatide (MOUNJARO) 15 mg/0.5 mL PnIj; Inject 15 mg into the skin once a week.  Dispense: 1 pen; Refill: 5    2. Acquired hypothyroidism  Assessment & Plan:  Will notify with results when available.       3. Asthma, unspecified asthma severity, unspecified whether complicated, unspecified whether persistent  -     montelukast (SINGULAIR) 10 mg tablet; TAKE ONE(1) TAB BY MOUTH EVERY NIGHT AT BEDTIME FOR ALLERGIES  Dispense: 30 tablet; Refill: 0    4. Obstructive sleep apnea  Assessment & Plan:  Wearing CPAP every night but SE of dry mouth and difficulty keeping mask in past.          Follow up in about 1 year (around 3/28/2024).          Discussed the diagnosis, prognosis, plan of care, chronic nature of and indications for, risks and benefits of treatment for conditions.  Continue all medications as prescribed unless otherwise noted.   Call if patient develops other symptoms or if not better in 2-3 days and sooner if worse. Emphasized the importance of compliance with all recommendations, including medication use and timely follow up. Instructed to return as noted be or sooner if patient develops  any other problems or if there are any other additional questions or concerns.

## 2023-03-28 NOTE — PATIENT INSTRUCTIONS
Doing well with mounjaro but temporarily hold metformin for possible reflux symptoms for next week. Continue other medications.

## 2023-03-30 ENCOUNTER — OFFICE VISIT (OUTPATIENT)
Dept: BEHAVIORAL HEALTH | Facility: CLINIC | Age: 58
End: 2023-03-30
Payer: COMMERCIAL

## 2023-03-30 VITALS
TEMPERATURE: 97 F | OXYGEN SATURATION: 97 % | WEIGHT: 205 LBS | HEART RATE: 76 BPM | DIASTOLIC BLOOD PRESSURE: 64 MMHG | SYSTOLIC BLOOD PRESSURE: 116 MMHG | BODY MASS INDEX: 35 KG/M2 | HEIGHT: 64 IN

## 2023-03-30 DIAGNOSIS — G47.00 INSOMNIA, UNSPECIFIED TYPE: Chronic | ICD-10-CM

## 2023-03-30 DIAGNOSIS — F41.1 GENERALIZED ANXIETY DISORDER: Chronic | ICD-10-CM

## 2023-03-30 DIAGNOSIS — F95.8 PHONIC TIC: Chronic | ICD-10-CM

## 2023-03-30 DIAGNOSIS — F33.1 DEPRESSION, MAJOR, RECURRENT, MODERATE: Primary | Chronic | ICD-10-CM

## 2023-03-30 DIAGNOSIS — G47.419 PRIMARY NARCOLEPSY WITHOUT CATAPLEXY: ICD-10-CM

## 2023-03-30 PROCEDURE — 99214 OFFICE O/P EST MOD 30 MIN: CPT | Mod: ,,, | Performed by: NURSE PRACTITIONER

## 2023-03-30 PROCEDURE — 99214 PR OFFICE/OUTPT VISIT, EST, LEVL IV, 30-39 MIN: ICD-10-PCS | Mod: ,,, | Performed by: NURSE PRACTITIONER

## 2023-03-30 RX ORDER — TRAZODONE HYDROCHLORIDE 150 MG/1
300 TABLET ORAL NIGHTLY
Qty: 60 TABLET | Refills: 2 | Status: SHIPPED | OUTPATIENT
Start: 2023-03-30

## 2023-03-30 RX ORDER — VORTIOXETINE 20 MG/1
1 TABLET, FILM COATED ORAL DAILY
Qty: 30 TABLET | Refills: 2 | Status: SHIPPED | OUTPATIENT
Start: 2023-03-30

## 2023-03-30 RX ORDER — CLONAZEPAM 0.5 MG/1
TABLET ORAL
Qty: 35 TABLET | Refills: 0 | Status: SHIPPED | OUTPATIENT
Start: 2023-03-30 | End: 2024-03-20

## 2023-03-30 RX ORDER — MODAFINIL 200 MG/1
200 TABLET ORAL DAILY
Qty: 45 TABLET | Refills: 1 | Status: SHIPPED | OUTPATIENT
Start: 2023-03-30

## 2023-03-30 NOTE — PROGRESS NOTES
"PSYCHIATRIC FOLLOW-UP VISIT NOTE    Chief Complaint   Patient presents with    Irritability     "These noises are really getting to me."         History of Present Illness  58 y.o. year old White female with hx of MDD, TRACY, phonic tics, and insomnia seen today for follow-up appointment and medication management.  Reports that she recently returned from a vacation with the family and that they had a good time.  Her mood has been so-so.  She is noticed persistent anxiety that leads to difficulty making decisions as well as an increase in her phonic tics.  She is taking her Vistaril 3 times a day as well as her clonazepam 3 times a day with little to no relief.  She is also continuing to have issues with her insomnia.  Difficulty finding inappropriate adaptive for her CPAP machine.  She often lies awake for extended period of time prior to being able to fall asleep.  She is not resting throughout the night and awakens fatigued.  She is still resistant to stopping her modafinil and I again educated on the effect this medication may be having on her tics.  We will be discontinuing her clonazepam due to inefficacy.  We will provide 0.5 mg b.i.d. as needed for 2 weeks and then 2.5 mg daily as needed for 7 days and then discontinue.  We will re-evaluate need for further medication changes at next visit. Patient denies SI/HI. Denies hallucinations and does not appear to be responding to internal stimuli or be internally preoccupied. No manic symptoms noted.       Objective:     Vitals:  Vitals:    03/30/23 1333   BP: 116/64   BP Location: Left arm   Patient Position: Sitting   Pulse: 76   Temp: 96.8 °F (36 °C)   TempSrc: Temporal   SpO2: 97%   Weight: 93 kg (205 lb)   Height: 5' 4" (1.626 m)       Wt Readings from Last 3 Encounters:   03/30/23 1333 93 kg (205 lb)   03/28/23 1302 93.3 kg (205 lb 9.6 oz)   01/25/23 1423 97.5 kg (215 lb)         Medication:    Current Outpatient Medications:     cholestyramine (QUESTRAN) 4 " gram packet, Take 1 packet by mouth 2 (two) times a day., Disp: , Rfl:     cloNIDine (CATAPRES) 0.1 MG tablet, Take 1 tablet (0.1 mg total) by mouth every evening., Disp: 30 tablet, Rfl: 2    cyclobenzaprine (FLEXERIL) 10 MG tablet, Take 10 mg by mouth daily as needed., Disp: , Rfl:     esomeprazole (NEXIUM) 20 MG capsule, TAKE ONE(1) CAP BY MOUTH ONCE DAY ON EMPTY STOMACH FOR STOMACH &/OR REFLUX /DO NOT CRUSH OR CHEW, Disp: 30 capsule, Rfl: 3    estradioL (ESTRACE) 1 MG tablet, Take 1 mg by mouth once daily., Disp: , Rfl:     gabapentin (NEURONTIN) 600 MG tablet, TAKE ONE(1) TAB BY MOUTH 3 TIMES DAILY WITH FOOD FOR PAIN, Disp: 90 tablet, Rfl: 0    levothyroxine (SYNTHROID) 112 MCG tablet, TAKE ONE(1) TAB BY MOUTH ONCE A DAY IN THE MORNING ON EMPTY STOMACH FOR THYROID, Disp: 90 tablet, Rfl: 1    meloxicam (MOBIC) 15 MG tablet, Take 15 mg by mouth once daily., Disp: , Rfl:     metaxalone (SKELAXIN) 800 MG tablet, Take 800 mg by mouth 3 (three) times daily., Disp: , Rfl:     metFORMIN (GLUCOPHAGE-XR) 500 MG ER 24hr tablet, TAKE ONE(1) TAB BY MOUTH EVERY EVENING WITH FOOD FOR INSULIN RESISTANCE, Disp: 90 tablet, Rfl: 1    mometasone 0.1% (ELOCON) 0.1 % cream, Apply 1 application topically 2 (two) times daily as needed., Disp: , Rfl:     montelukast (SINGULAIR) 10 mg tablet, TAKE ONE(1) TAB BY MOUTH EVERY NIGHT AT BEDTIME FOR ALLERGIES, Disp: 30 tablet, Rfl: 0    mupirocin (BACTROBAN) 2 % ointment, Apply 1 g topically 3 (three) times daily., Disp: , Rfl:     rOPINIRole (REQUIP) 2 MG tablet, Take 1 tablet (2 mg total) by mouth every evening., Disp: 30 tablet, Rfl: 2    tirzepatide (MOUNJARO) 15 mg/0.5 mL PnIj, Inject 15 mg into the skin once a week., Disp: 1 pen, Rfl: 5    traMADoL (ULTRAM) 50 mg tablet, Take 50 mg by mouth 2 (two) times daily as needed., Disp: , Rfl:     triamcinolone acetonide 0.1% (KENALOG) 0.1 % ointment, Apply 1 application topically 2 (two) times daily., Disp: , Rfl:     VITAMIN D2 1,250 mcg  (50,000 unit) capsule, TAKE ONE(1) CAP BY MOUTH ONCE A WEEK ON THE SAME DAY(S) EACH WEEK FOR VITAMIN D /DO NOT CRUSH OR CHEW, Disp: 13 capsule, Rfl: 0    clonazePAM (KLONOPIN) 0.5 MG tablet, Take 1 tablet (0.5 mg total) by mouth 2 (two) times daily as needed for Anxiety for 14 days, THEN 1 tablet (0.5 mg total) daily as needed for Anxiety., Disp: 35 tablet, Rfl: 0    modafiniL (PROVIGIL) 200 MG Tab, Take 1 tablet (200 mg total) by mouth once daily., Disp: 45 tablet, Rfl: 1    oxyCODONE-acetaminophen (PERCOCET) 7.5-325 mg per tablet, Take 1 tablet by mouth 4 (four) times daily as needed., Disp: , Rfl:     traZODone (DESYREL) 150 MG tablet, Take 2 tablets (300 mg total) by mouth every evening., Disp: 60 tablet, Rfl: 2    TRINTELLIX 20 mg Tab, Take 1 tablet (20 mg total) by mouth once daily., Disp: 30 tablet, Rfl: 2       Significant Labs: - none at this time    Significant Imaging: - none at this time    Physical Exam  Vitals and nursing note reviewed.   Constitutional:       General: She is awake.      Appearance: Normal appearance.   Musculoskeletal:      Comments: Full ROM   Neurological:      Mental Status: She is alert.   Psychiatric:         Attention and Perception: Attention and perception normal. She does not perceive auditory or visual hallucinations.         Mood and Affect: Affect normal.         Speech: Speech normal.         Behavior: Behavior is cooperative.         Thought Content: Thought content does not include homicidal or suicidal ideation.         Cognition and Memory: Cognition and memory normal.        Review of Systems     Mental Status Exam:  Presentation:  - Appearance: 58 y.o. year old White female, appears stated age, appears Casually dressed and Well groomed  - Motility: Erect when standing, Steady gait, No EPS or Tremors, No psychomotor agitation or retardation appreciated  - Behavior: calm, cooperative, good eye contact  Speech:  - Character/Organization: spontaneous, fluent, normal  "volume, normal rate, normal rhythm  Emotional State:  - Mood: "anxious"   - Affect: congruent and appropriate  Thought:  - Process: logical, linear, organized , goal-directed  - Preoccupations: no ruminations, rituals, or phobias appreciated  - Delusions: no persecutory, paranoid, or grandiose delusions appreciated  - Perception: denies AVH, not actively responding to internal stimuli  - SI/HI: denies/denies  Sensorium & Intellect:  - Sensorium: AAOx4  - Memory: intact to recent and remote events  - Attention/Concentration: good/good  - Insight/Judgement: good/good    Gait: normal swing and stance  MSK:no rigidity appreciated    All other systems without acute issues unless noted in HPI      Assessment/Plan      ICD-10-CM ICD-9-CM    1. Depression, major, recurrent, moderate  F33.1 296.32 TRINTELLIX 20 mg Tab      2. Generalized anxiety disorder  F41.1 300.02 clonazePAM (KLONOPIN) 0.5 MG tablet      3. Phonic tic  F95.8 307.20       4. Insomnia, unspecified type  G47.00 780.52 traZODone (DESYREL) 150 MG tablet      5. Primary narcolepsy without cataplexy  G47.419 347.00 modafiniL (PROVIGIL) 200 MG Tab         We will discontinue clonazepam due to inefficacy.  Patient to take 0.5 mg b.i.d. for the next 2 weeks followed by 0.5 mg daily for the next week and then stop.    Continue other medications without change    Reviewed non-pharmacologic coping skills to decrease anxiety, such as deep breathing, journaling, exercise, recognizing triggers, meditation, healthy diet and exercise, routine sleep schedule, negative thought recognition, time for hobbies/interests, relaxation techniques, avoidance of substance abuse, limiting caffeine, benefits of counseling, and biofeedback. Patient verbalized understanding.     checked. Results as expected. No suspected diversion or abuse of controlled substances.    Potential side effects and risks vs benefits of current treatment plan reviewed with patient. Applicable black box " warnings reviewed. Encouraged patient not to alter dosages or abruptly discontinue medications without contacting prescriber first, due to risk of worsening symptoms and decompensation of mental status. Warned of risks associated with herbal remedies and supplements while taking psychotropic medications and of the need to consult prescriber prior to adding any of these to current regimen. Patient should abstain from abuse of alcohol, prescription medications, and illicit drugs. Reviewed when to contact clinic and/or seek emergent care, such as but not limited to, onset/worsening SI/HI, hallucinations, delusions, manic symptoms. Pt verbalized understanding and agreement of these warnings/recommendations and verbally consented to treatment plan.      Follow up in about 6 weeks (around 5/11/2023) for Medication Management.    Francisco Javier Dent, CLAIREP

## 2023-04-04 ENCOUNTER — TELEPHONE (OUTPATIENT)
Dept: BEHAVIORAL HEALTH | Facility: CLINIC | Age: 58
End: 2023-04-04

## 2023-04-04 DIAGNOSIS — G47.419 PRIMARY NARCOLEPSY WITHOUT CATAPLEXY: ICD-10-CM

## 2023-04-04 DIAGNOSIS — F33.1 DEPRESSION, MAJOR, RECURRENT, MODERATE: Primary | ICD-10-CM

## 2023-04-04 DIAGNOSIS — F95.8 PHONIC TIC: ICD-10-CM

## 2023-04-04 DIAGNOSIS — F41.1 GENERALIZED ANXIETY DISORDER: ICD-10-CM

## 2023-04-04 NOTE — TELEPHONE ENCOUNTER
Provider has not yet given this nurse further information regarding referral. Referral has not been made in chart.

## 2023-04-04 NOTE — TELEPHONE ENCOUNTER
----- Message from Mackenzie Chamorro sent at 4/3/2023  4:08 PM CDT -----  Regarding: call back  Pt request for Mary to call her back [phone], pt. Was supposed to be referred

## 2023-04-05 ENCOUNTER — TELEPHONE (OUTPATIENT)
Dept: FAMILY MEDICINE | Facility: CLINIC | Age: 58
End: 2023-04-05
Payer: COMMERCIAL

## 2023-04-05 NOTE — TELEPHONE ENCOUNTER
Spoke with pt and she was told that a referral was entered in chart yesterday for a psychiatrist in [facility]. Patient states that she saw him last week and she didn't understand why it was taken them delmar long to put one in. I explained to her that she needed to call back them and speak with them. We weren't going to be able to refer her due to not knowing the reason behind referral. That Sundar was going to have to.

## 2023-04-05 NOTE — TELEPHONE ENCOUNTER
----- Message from Landy Gonzalez sent at 4/5/2023  1:40 PM CDT -----  Regarding: Needs help with referral      She sees Sundar Dent in [place].  He is supposed to be sending her to another therapist.  The patient has called [facility]ili[facility]'s office several times to get the name of the doctor they are referring her too but no one calls her back.    Patient wants YOU to call [facility]'s office to get the name of the new doctor and then call that doctor and put in a word for her to get in.  Call the patient back with an update as to what you found out or if you have any questions.        Call back [phone]

## 2023-04-13 ENCOUNTER — TELEPHONE (OUTPATIENT)
Dept: FAMILY MEDICINE | Facility: CLINIC | Age: 58
End: 2023-04-13
Payer: COMMERCIAL

## 2023-04-13 NOTE — TELEPHONE ENCOUNTER
----- Message from Kiana Bingham DNP sent at 4/13/2023  7:30 AM CDT -----  Notify b12 in excess, can decrease supplementation. Stable cbc, no anemia, normal plt. Cmp good, hgb a1c, thyroid all good and down to normal range, very well controlled, continue Mounjaro. LDL slightly higher than goal at 95, other lipid ratios excellent. Continue questran, current medications. Recheck cmp, flp, hgb a1c, vitamin D, tsh, cbc in 6 months unless problems.

## 2023-05-09 DIAGNOSIS — E55.9 VITAMIN D DEFICIENCY: ICD-10-CM

## 2023-05-09 RX ORDER — ERGOCALCIFEROL 1.25 MG/1
CAPSULE ORAL
Qty: 13 CAPSULE | Refills: 0 | Status: SHIPPED | OUTPATIENT
Start: 2023-05-09 | End: 2023-08-16

## 2023-05-09 NOTE — TELEPHONE ENCOUNTER
Last spoke to pt on yesterday and [place] [facility] [place] to establish [place]. He advised referral be sent to Dr. Nunez's office in [place]. Though referral states prefer MD, pt agreed to see NP. This was verbalized to his office and received without complaint. Referral sent and confirmed receipt.

## 2023-05-10 DIAGNOSIS — R93.89 ABNORMAL MRI: Primary | ICD-10-CM

## 2023-05-18 ENCOUNTER — HOSPITAL ENCOUNTER (OUTPATIENT)
Dept: RADIOLOGY | Facility: HOSPITAL | Age: 58
Discharge: HOME | End: 2023-05-18
Attending: NURSE PRACTITIONER
Payer: COMMERCIAL

## 2023-05-18 ENCOUNTER — TELEPHONE (OUTPATIENT)
Dept: FAMILY MEDICINE | Facility: CLINIC | Age: 58
End: 2023-05-18
Payer: COMMERCIAL

## 2023-05-18 DIAGNOSIS — R93.89 ABNORMAL MRI: ICD-10-CM

## 2023-05-18 PROCEDURE — 74178 CT ABD&PLV WO CNTR FLWD CNTR: CPT | Mod: TC

## 2023-05-18 PROCEDURE — 25500020 PHARM REV CODE 255: Performed by: NURSE PRACTITIONER

## 2023-05-18 PROCEDURE — A9698 NON-RAD CONTRAST MATERIALNOC: HCPCS | Performed by: NURSE PRACTITIONER

## 2023-05-18 RX ADMIN — BARIUM SULFATE 900 ML: 20 SUSPENSION ORAL at 07:05

## 2023-05-18 RX ADMIN — IOPAMIDOL 100 ML: 755 INJECTION, SOLUTION INTRAVENOUS at 09:05

## 2023-05-18 NOTE — TELEPHONE ENCOUNTER
----- Message from Roseanna Stafford sent at 5/18/2023  1:08 PM CDT -----  Patient called stating she did her MRI this morning and was told we would possibly have results today.  Patient knows Kiana is out today, but was wondering if one of the providers could give her the results.  Patient said the MRI was done due to a spot on her liver

## 2023-05-18 NOTE — TELEPHONE ENCOUNTER
Per Candelaria-Pt notified that it appears to be a benign cyst and that Kiana will contact her next week with any further instructions. Pt verbalized understanding.

## 2023-05-19 LAB
AMPHETAMINES UR QL SCN: NEGATIVE NG/ML
BARBITURATES UR QL SCN: NEGATIVE NG/ML
BENZODIAZ UR QL: NEGATIVE NG/ML
BZE UR QL: NEGATIVE NG/ML
CANNABINOIDS UR QL SCN: NEGATIVE NG/ML
ETHANOL UR-MCNC: NEGATIVE %
OPIATES UR QL: NEGATIVE NG/ML
PCP UR QL: NEGATIVE NG/ML

## 2023-05-24 ENCOUNTER — TELEPHONE (OUTPATIENT)
Dept: FAMILY MEDICINE | Facility: CLINIC | Age: 58
End: 2023-05-24
Payer: COMMERCIAL

## 2023-05-24 ENCOUNTER — DOCUMENTATION ONLY (OUTPATIENT)
Dept: BEHAVIORAL HEALTH | Facility: CLINIC | Age: 58
End: 2023-05-24
Payer: COMMERCIAL

## 2023-05-24 NOTE — TELEPHONE ENCOUNTER
----- Message from Kiana Bingham DNP sent at 5/22/2023  6:30 PM CDT -----  Please investigate who ordered MRI of thoracic spine finding of increased uptake at hepatic mass. Send with copies of MRI and ct to GI for further evaluation, but also inform constipation changes throughout colon without other abnormalities.

## 2023-05-24 NOTE — TELEPHONE ENCOUNTER
Called pt and she states that imaging was ordered with Dr. Higginbotham and followed back up on it after image was done.

## 2023-06-13 ENCOUNTER — DOCUMENTATION ONLY (OUTPATIENT)
Dept: BEHAVIORAL HEALTH | Facility: CLINIC | Age: 58
End: 2023-06-13
Payer: COMMERCIAL

## 2023-06-22 ENCOUNTER — DOCUMENTATION ONLY (OUTPATIENT)
Dept: FAMILY MEDICINE | Facility: CLINIC | Age: 58
End: 2023-06-22
Payer: COMMERCIAL

## 2023-07-06 ENCOUNTER — OFFICE VISIT (OUTPATIENT)
Dept: OBSTETRICS AND GYNECOLOGY | Facility: CLINIC | Age: 58
End: 2023-07-06
Payer: COMMERCIAL

## 2023-07-06 VITALS
HEIGHT: 62 IN | TEMPERATURE: 97 F | DIASTOLIC BLOOD PRESSURE: 62 MMHG | BODY MASS INDEX: 36.55 KG/M2 | WEIGHT: 198.63 LBS | SYSTOLIC BLOOD PRESSURE: 108 MMHG

## 2023-07-06 DIAGNOSIS — Z90.722 S/P BSO (BILATERAL SALPINGO-OOPHORECTOMY): ICD-10-CM

## 2023-07-06 DIAGNOSIS — Z80.3 FAMILY HISTORY OF BREAST CANCER IN MOTHER: ICD-10-CM

## 2023-07-06 DIAGNOSIS — R45.4 IRRITABILITY: ICD-10-CM

## 2023-07-06 DIAGNOSIS — Z01.411 ABNORMAL GYNECOLOGICAL EXAMINATION: Primary | ICD-10-CM

## 2023-07-06 DIAGNOSIS — R61 NIGHT SWEATS: ICD-10-CM

## 2023-07-06 DIAGNOSIS — Z12.31 SCREENING MAMMOGRAM FOR BREAST CANCER: ICD-10-CM

## 2023-07-06 DIAGNOSIS — N89.8 VAGINAL LESION: ICD-10-CM

## 2023-07-06 DIAGNOSIS — R45.86 MOOD SWINGS: ICD-10-CM

## 2023-07-06 DIAGNOSIS — R23.2 HOT FLASHES: ICD-10-CM

## 2023-07-06 DIAGNOSIS — N89.8 VAGINAL DISCHARGE: ICD-10-CM

## 2023-07-06 DIAGNOSIS — Z90.710 S/P LAPAROSCOPIC ASSISTED VAGINAL HYSTERECTOMY (LAVH): ICD-10-CM

## 2023-07-06 DIAGNOSIS — Z79.890 HORMONE REPLACEMENT THERAPY: ICD-10-CM

## 2023-07-06 LAB
BACTERIA HYPHAE, POC: NEGATIVE
YEAST, POC: NEGATIVE

## 2023-07-06 PROCEDURE — 3044F HG A1C LEVEL LT 7.0%: CPT | Mod: CPTII,,, | Performed by: NURSE PRACTITIONER

## 2023-07-06 PROCEDURE — 3078F PR MOST RECENT DIASTOLIC BLOOD PRESSURE < 80 MM HG: ICD-10-PCS | Mod: CPTII,,, | Performed by: NURSE PRACTITIONER

## 2023-07-06 PROCEDURE — 87210 POCT WET PREP: ICD-10-PCS | Mod: QW,,, | Performed by: NURSE PRACTITIONER

## 2023-07-06 PROCEDURE — 87220 TISSUE EXAM FOR FUNGI: CPT | Mod: ,,, | Performed by: NURSE PRACTITIONER

## 2023-07-06 PROCEDURE — 1159F PR MEDICATION LIST DOCUMENTED IN MEDICAL RECORD: ICD-10-PCS | Mod: CPTII,,, | Performed by: NURSE PRACTITIONER

## 2023-07-06 PROCEDURE — 3008F BODY MASS INDEX DOCD: CPT | Mod: CPTII,,, | Performed by: NURSE PRACTITIONER

## 2023-07-06 PROCEDURE — 3074F PR MOST RECENT SYSTOLIC BLOOD PRESSURE < 130 MM HG: ICD-10-PCS | Mod: CPTII,,, | Performed by: NURSE PRACTITIONER

## 2023-07-06 PROCEDURE — 99396 PR PREVENTIVE VISIT,EST,40-64: ICD-10-PCS | Mod: ,,, | Performed by: NURSE PRACTITIONER

## 2023-07-06 PROCEDURE — 87220 POCT KOH: ICD-10-PCS | Mod: ,,, | Performed by: NURSE PRACTITIONER

## 2023-07-06 PROCEDURE — 3074F SYST BP LT 130 MM HG: CPT | Mod: CPTII,,, | Performed by: NURSE PRACTITIONER

## 2023-07-06 PROCEDURE — 99396 PREV VISIT EST AGE 40-64: CPT | Mod: ,,, | Performed by: NURSE PRACTITIONER

## 2023-07-06 PROCEDURE — 1160F RVW MEDS BY RX/DR IN RCRD: CPT | Mod: CPTII,,, | Performed by: NURSE PRACTITIONER

## 2023-07-06 PROCEDURE — 87210 SMEAR WET MOUNT SALINE/INK: CPT | Mod: QW,,, | Performed by: NURSE PRACTITIONER

## 2023-07-06 PROCEDURE — 3008F PR BODY MASS INDEX (BMI) DOCUMENTED: ICD-10-PCS | Mod: CPTII,,, | Performed by: NURSE PRACTITIONER

## 2023-07-06 PROCEDURE — 3078F DIAST BP <80 MM HG: CPT | Mod: CPTII,,, | Performed by: NURSE PRACTITIONER

## 2023-07-06 PROCEDURE — 1160F PR REVIEW ALL MEDS BY PRESCRIBER/CLIN PHARMACIST DOCUMENTED: ICD-10-PCS | Mod: CPTII,,, | Performed by: NURSE PRACTITIONER

## 2023-07-06 PROCEDURE — 1159F MED LIST DOCD IN RCRD: CPT | Mod: CPTII,,, | Performed by: NURSE PRACTITIONER

## 2023-07-06 PROCEDURE — 3044F PR MOST RECENT HEMOGLOBIN A1C LEVEL <7.0%: ICD-10-PCS | Mod: CPTII,,, | Performed by: NURSE PRACTITIONER

## 2023-07-06 RX ORDER — ESTRADIOL 1.53 MG/1
1 SPRAY TRANSDERMAL DAILY
Qty: 8.1 ML | Refills: 12 | Status: SHIPPED | OUTPATIENT
Start: 2023-07-06 | End: 2023-08-01

## 2023-07-06 RX ORDER — RISPERIDONE 1 MG/1
1 TABLET ORAL 2 TIMES DAILY
COMMUNITY
Start: 2023-06-27 | End: 2023-08-01

## 2023-07-06 RX ORDER — VALACYCLOVIR HYDROCHLORIDE 500 MG/1
500 TABLET, FILM COATED ORAL 2 TIMES DAILY
Qty: 6 TABLET | Refills: 11 | Status: SHIPPED | OUTPATIENT
Start: 2023-07-06 | End: 2023-12-06

## 2023-07-06 RX ORDER — TRIAMCINOLONE ACETONIDE 1 MG/G
PASTE DENTAL
COMMUNITY
Start: 2023-06-12

## 2023-07-06 NOTE — PROGRESS NOTES
Chief Complaint: Annual exam    Chief Complaint   Patient presents with    Well Woman     Annual Gyn Exam.  Taking Estradiol 1 mg. C/o Night sweats, Mood swings, irritability, and vaginal discharge w/ odor x 3 months.  +small bump to vulva since Monday.  +Tenderness w/ touch.  LAVH w/ BS&O. Last Annual 21       HPI:   58 y.o. WF  S/p LAVH BSO, presents for an annual gyn exam.  Pt is currently on Estradiol 1mg. Pt c/o Night sweats, Mood swings, irritability, and vaginal discharge w/ odor x 3 months. Pt reports small bump to vulva since Monday, tender to touch. Denies dysuria, vaginal itching or irritation.      FmHx: +breast cancer in mother and sister, negative for uterine, ovarian, and colon cancers.     Labs / Significant Studies:  MENOPAUSAL:  Age at menarche: 14  Age at menopause: 51  Hysterectomy:  Yes, LAVH w/ BS&O  Last pap:1/3/2018 - ASCUS w/ Neg HPV  H/o Abnormal Pap: Yes   Colposcopy: No  Postcoital Bleeding: No  Sexually Active: Yes   Dyspareunia: No  H/o STI: No   HRT: Estradiol 1 mg  MM/15/22 - Benign  H/o abnl    Family History   Problem Relation Age of Onset    COPD Father     Breast cancer Mother 72    Bipolar disorder Mother     Breast cancer Sister 54    Bipolar disorder Maternal Aunt     Bipolar disorder Maternal Uncle     Anxiety disorder Other     Depression Other     Colon cancer Neg Hx     Ovarian cancer Neg Hx     Uterine cancer Neg Hx     Cervical cancer Neg Hx          Past Medical History:   Diagnosis Date    Basal cell carcinoma of neck, face, and arm     Carpal tunnel syndrome     Chronic diarrhea     Chronic gastritis     Decreased estrogen level     Deviated nasal septum     Fatigue     GERD (gastroesophageal reflux disease)     H/O endocrine disorder     Hiatal hernia     Hormone replacement therapy     Hypertrophy of nasal turbinates     Hypothyroidism, unspecified     Insomnia     Iron deficiency anemia, unspecified     Memory impairment     Metabolic syndrome X      Mixed anxiety and depressive disorder     Mixed hyperlipidemia     Narcolepsy     Obstructive sleep apnea     Phonic tic     Prediabetes     Radicular pain     Restless legs     Stress incontinence (female) (male)     Ulcer of nasal septum     Vitamin B12 deficiency     Vitamin D deficiency      Past Surgical History:   Procedure Laterality Date    APPLICATION, CARTILAGE GRAFT, NASAL SEPTUM  09/26/2019    BASAL CELL CARCINOMA EXCISION  03/21/2019    BIOPSY  08/31/2022    right cheek    BUNIONECTOMY      CHOLECYSTECTOMY      COLONOSCOPY  04/17/2018    DILATION AND CURETTAGE OF UTERUS      ESOPHAGOGASTRODUODENOSCOPY  06/03/2021    HYSTERECTOMY, VAGINAL, LAPAROSCOPY-ASSISTED, WITH SALPINGO-OOPHORECTOY  01/29/2018    AUB    LAPAROSCOPIC GASTRIC BANDING      Placement    LAPAROSCOPIC REMOVAL OF GASTRIC BAND  2008       Current Outpatient Medications:     cholestyramine (QUESTRAN) 4 gram packet, Take 1 packet by mouth 2 (two) times a day., Disp: , Rfl:     cloNIDine (CATAPRES) 0.1 MG tablet, Take 1 tablet (0.1 mg total) by mouth every evening., Disp: 30 tablet, Rfl: 2    cyclobenzaprine (FLEXERIL) 10 MG tablet, Take 10 mg by mouth daily as needed., Disp: , Rfl:     esomeprazole (NEXIUM) 20 MG capsule, TAKE ONE(1) CAP BY MOUTH ONCE DAY ON EMPTY STOMACH FOR STOMACH &/OR REFLUX /DO NOT CRUSH OR CHEW, Disp: 30 capsule, Rfl: 3    estradioL (ESTRACE) 1 MG tablet, Take 1 mg by mouth once daily., Disp: , Rfl:     levothyroxine (SYNTHROID) 112 MCG tablet, TAKE ONE(1) TAB BY MOUTH ONCE A DAY IN THE MORNING ON EMPTY STOMACH FOR THYROID, Disp: 90 tablet, Rfl: 1    meloxicam (MOBIC) 15 MG tablet, Take 15 mg by mouth once daily., Disp: , Rfl:     metaxalone (SKELAXIN) 800 MG tablet, Take 800 mg by mouth 3 (three) times daily., Disp: , Rfl:     metFORMIN (GLUCOPHAGE-XR) 500 MG ER 24hr tablet, TAKE ONE(1) TAB BY MOUTH EVERY EVENING WITH FOOD FOR INSULIN RESISTANCE, Disp: 90 tablet, Rfl: 1    modafiniL (PROVIGIL) 200 MG Tab, Take 1 tablet  (200 mg total) by mouth once daily., Disp: 45 tablet, Rfl: 1    mometasone 0.1% (ELOCON) 0.1 % cream, Apply 1 application topically 2 (two) times daily as needed., Disp: , Rfl:     montelukast (SINGULAIR) 10 mg tablet, TAKE ONE(1) TAB BY MOUTH EVERY NIGHT AT BEDTIME FOR ALLERGIES, Disp: 30 tablet, Rfl: 0    mupirocin (BACTROBAN) 2 % ointment, Apply 1 g topically 3 (three) times daily., Disp: , Rfl:     risperiDONE (RISPERDAL) 1 MG tablet, Take 1 mg by mouth 2 (two) times daily., Disp: , Rfl:     rOPINIRole (REQUIP) 2 MG tablet, Take 1 tablet (2 mg total) by mouth every evening., Disp: 30 tablet, Rfl: 2    tirzepatide (MOUNJARO) 15 mg/0.5 mL PnIj, Inject 15 mg into the skin once a week., Disp: 1 pen, Rfl: 5    traMADoL (ULTRAM) 50 mg tablet, Take 50 mg by mouth 2 (two) times daily as needed., Disp: , Rfl:     traZODone (DESYREL) 150 MG tablet, Take 2 tablets (300 mg total) by mouth every evening., Disp: 60 tablet, Rfl: 2    triamcinolone acetonide 0.1% (KENALOG) 0.1 % paste, SMARTSIG:TO TEETH, Disp: , Rfl:     TRINTELLIX 20 mg Tab, Take 1 tablet (20 mg total) by mouth once daily., Disp: 30 tablet, Rfl: 2    VITAMIN D2 1,250 mcg (50,000 unit) capsule, TAKE ONE(1) CAP BY MOUTH ONCE A WEEK ON THE SAME DAY(S) EACH WEEK FOR VITAMIN D /DO NOT CRUSH OR CHEW, Disp: 13 capsule, Rfl: 0    clonazePAM (KLONOPIN) 0.5 MG tablet, Take 1 tablet (0.5 mg total) by mouth 2 (two) times daily as needed for Anxiety for 14 days, THEN 1 tablet (0.5 mg total) daily as needed for Anxiety., Disp: 35 tablet, Rfl: 0    Review of patient's allergies indicates:  No Known Allergies    Social History     Tobacco Use    Smoking status: Never     Passive exposure: Never    Smokeless tobacco: Never   Substance Use Topics    Alcohol use: Yes    Drug use: Never       Review of Systems:  General/Constitutional: Chills denies. Fatigue/weakness denies. Fever denies. Night sweats admits Hot flashes admits.  Mood swings admits.   Respiratory: Cough denies.  "Hemoptysis denies. SOB denies. Sputum production denies. Wheezing denies .   Cardiovascular: Chest pain denies . Dizziness denies. Palpitations denies. Swelling in hands/feet denies    Gastrointestinal: Abdominal pain denies. Blood in stool denies. Constipation denies. Diarrhea denies. Heartburn denies. Nausea denies. Vomiting denies    Genitourinary: Incontinence denies. Blood in urine denies. Frequent urination denies. Painful urination denies. Urinary urgency denies. Nocturia denies    Gynecologic: Irregular menses denies. Heavy bleeding denies. Painful menses denies. Vaginal discharge admits Vaginal odor admits Vaginal itching denies. Vaginal lesion admits Pelvic pain denies. Decreased libido denies. Vulvar lesion denies. Prolapse of genital organs denies. Painful intercourse denies. Postcoital bleeding denies    Psychiatric: Depression denies. Anxiety denies     Physical Exam:   Vitals:    07/06/23 1356   BP: 108/62   BP Location: Left arm   Patient Position: Sitting   BP Method: Large (Manual)   Temp: 97.3 °F (36.3 °C)   TempSrc: Temporal   Weight: 90.1 kg (198 lb 10.2 oz)   Height: 5' 2" (1.575 m)       Body mass index is 36.33 kg/m².       Chaperone: present.     General appearance: healthy, well-nourished and well-developed     Psychiatric: Orientation to time, place and person. Normal mood and affect and active, alert     Skin: Appearance: no rashes or lesions.     Neck:   Neck: supple, FROM, trachea midline. and no masses   Thyroid: no enlargement or nodules and non-tender.       Cardiovascular:   Auscultation: RRR and no murmur.   Peripheral Vascular: no varicosities, LLE edema, RLE edema, calf tenderness, and palpable cord and pedal pulses intact.     Lungs:   Respiratory effort: no intercostal retractions or accessory muscle usage.   Auscultation: no wheezing, rales/crackles, or rhonchi and clear to auscultation.     Breast:   Inspection/Palpation: no tenderness, discrete/distinct masses, skin changes, " or abnormal secretions. Nipple appearance normal.     Abdomen:   Auscultation/Inspection/Palpation: no hepatomegaly, splenomegaly, masses, tenderness or CVA tenderness and soft, non-distended bowel sounds preset.    Hernia: no palpable hernias.     Female Genitalia:    Vulva: no masses, tenderness   + (6) lesions noted to left and right labia majora, no drainage   Bladder/Urethra: no urethral discharge or mass, normal meatus, bladder non-distended.    Vagina: no tenderness, erythema, cystocele, rectocele, abnormal vaginal discharge or vesicle(s) or ulcers    Cervix: cuff intact, no masses   Uterus: absent   Adnexa/Parametria: absent    Lymph Nodes:   Palpation: non tender submandibular nodes, axillary nodes, or inguinal nodes.     Rectal Exam:   Rectum: normal perianal skin.       Assessment:     Patient Active Problem List   Diagnosis    Depression, major, recurrent, moderate    Generalized anxiety disorder    Insomnia    Phonic tic    Hypothyroidism, unspecified    Obstructive sleep apnea       Health Maintenance Due   Topic Date Due    HIV Screening  Never done     Health Maintenance Topics with due status: Not Due       Topic Last Completion Date    Colorectal Cancer Screening 04/17/2018    TETANUS VACCINE 05/17/2021    Mammogram 09/15/2022    Influenza Vaccine 10/12/2022    Hemoglobin A1c (Prediabetes) 04/10/2023    Lipid Panel 04/10/2023         Plan:    Brenda was seen today for well woman.    Diagnoses and all orders for this visit:    Abnormal gynecological examination  No PAP  Reviewed calcium needs, exercise, and prevention of osteoporosis.  Healthy diet  Annual MMG  Discussed breast self-awareness  Colonoscopy q 10 yrs  Reviewed normal menopause and menopausal symptoms  RTC 1 yr    Hormone replacement therapy  - Reviewed the physiologic roles of estrogen, progesterone and androgens in the female both positive and negative.     - Explained that with menopause comes a dramatic reduction in these hormones  and that changes take place in their absence.     - Discussed symptoms of decreased hormone levels and that these symptoms usually last 6 months to 2 years.     - Reviewed hormone replacement options as well as indications and contraindications.     - Discussed the WHI study findings and current FDA recommendations. Also discussed current recommendations for breast screening.     - Reviewed precautions when taking female hormones and symptoms to be aware of such as headache, visual change, focal weakness or numbness, SOB,chest pain, pain in thigh or calf, breast pain or lump, and vaginal bleeding. Instructed to call immediately and stop HRT if any of these symptoms occur.     -Advised patient of increased risk of stroke, heart attack, and blood clots.     Discontinue Estradiol 1mg   Rx: Evamist 1 spray daily - f/u 1 month    Night sweats and Hot flashes  - Reviewed self-help strategies (dietary changes/exercise/accupuncture/etc.), herbal and other OTC therapies, hormonal options as well as other medications used to treat common menopausal symptoms.    - Layered clothing, fans    Mood swings    Irritability    Vaginal lesion  One Swab HSV 1&2  Valtrex 500 mg po bid x 3 days    Vaginal discharge w/ odor  - AVOID: Scented soaps or shampoos, Bubble bath, Scented detergens, Feminine sprays, douches, powders    - Fragrance-free pH neutral soap    - Unscented detergents    Family history of breast cancer in mother  -     Mammo Digital Screening Bilat w/ Robin; Future  - Discussed recommendations of annual screening after age of 40 with mammogram and MRI for patients with lifetime risk greater than 25%     - Explained that screening is not 100% reliable.  Advised patient if she notices any changes to her breast including a lump, mass, dimpling, discharge, rash, or tenderness she should contact us immediately.     - Recommend monthly BSE     S/P laparoscopic assisted vaginal hysterectomy (LAVH)    S/P BSO (bilateral  salpingo-oophorectomy)    Screening mammogram for breast cancer  -     Mammo Digital Screening Bilat w/ Robin; Future

## 2023-07-10 ENCOUNTER — TELEPHONE (OUTPATIENT)
Dept: OBSTETRICS AND GYNECOLOGY | Facility: CLINIC | Age: 58
End: 2023-07-10
Payer: COMMERCIAL

## 2023-07-10 NOTE — TELEPHONE ENCOUNTER
Patient notified by phone of positive HSV 2 culture.  Patient currently taking Valtrex.  Advised of refills at pharmacy and to take p.r.n. outbreak.

## 2023-07-12 ENCOUNTER — TELEPHONE (OUTPATIENT)
Dept: FAMILY MEDICINE | Facility: CLINIC | Age: 58
End: 2023-07-12
Payer: COMMERCIAL

## 2023-07-12 NOTE — TELEPHONE ENCOUNTER
----- Message from Roseanna Stafford sent at 7/12/2023  2:33 PM CDT -----  Patient is having restless leg during the day and wants to know what to do.  Can she take a requip dose during the day, or increase night time dose?  Please call     
Alert and oriented to person, place, time/situation. normal mood and affect. no apparent risk to self or others.

## 2023-07-31 LAB
GARDNERELLA VAGINALIS: NEGATIVE
OTHER MICROSC. OBSERVATIONS: NEGATIVE
POC BACTERIAL VAGINOSIS: NEGATIVE
POC CLUE CELLS: NEGATIVE
TRICHOMONAS, POC: NEGATIVE
WBC: NEGATIVE
YEAST WET PREP: NEGATIVE

## 2023-08-01 ENCOUNTER — OFFICE VISIT (OUTPATIENT)
Dept: FAMILY MEDICINE | Facility: CLINIC | Age: 58
End: 2023-08-01
Payer: COMMERCIAL

## 2023-08-01 VITALS
BODY MASS INDEX: 35.7 KG/M2 | HEART RATE: 93 BPM | TEMPERATURE: 98 F | WEIGHT: 194 LBS | SYSTOLIC BLOOD PRESSURE: 119 MMHG | HEIGHT: 62 IN | OXYGEN SATURATION: 97 % | RESPIRATION RATE: 20 BRPM | DIASTOLIC BLOOD PRESSURE: 70 MMHG

## 2023-08-01 DIAGNOSIS — G47.419 PRIMARY NARCOLEPSY WITHOUT CATAPLEXY: ICD-10-CM

## 2023-08-01 DIAGNOSIS — E11.9 CONTROLLED TYPE 2 DIABETES MELLITUS WITHOUT COMPLICATION, WITHOUT LONG-TERM CURRENT USE OF INSULIN: Primary | ICD-10-CM

## 2023-08-01 DIAGNOSIS — G25.81 RESTLESS LEGS: ICD-10-CM

## 2023-08-01 LAB — GLUCOSE SERPL-MCNC: 94 MG/DL (ref 70–110)

## 2023-08-01 PROCEDURE — 99212 PR OFFICE/OUTPT VISIT, EST, LEVL II, 10-19 MIN: ICD-10-PCS | Mod: ,,, | Performed by: NURSE PRACTITIONER

## 2023-08-01 PROCEDURE — 3008F BODY MASS INDEX DOCD: CPT | Mod: CPTII,,, | Performed by: NURSE PRACTITIONER

## 2023-08-01 PROCEDURE — 1159F MED LIST DOCD IN RCRD: CPT | Mod: CPTII,,, | Performed by: NURSE PRACTITIONER

## 2023-08-01 PROCEDURE — 82962 POCT GLUCOSE, HAND-HELD DEVICE: ICD-10-PCS | Mod: ,,, | Performed by: NURSE PRACTITIONER

## 2023-08-01 PROCEDURE — 82962 GLUCOSE BLOOD TEST: CPT | Mod: ,,, | Performed by: NURSE PRACTITIONER

## 2023-08-01 PROCEDURE — 3074F PR MOST RECENT SYSTOLIC BLOOD PRESSURE < 130 MM HG: ICD-10-PCS | Mod: CPTII,,, | Performed by: NURSE PRACTITIONER

## 2023-08-01 PROCEDURE — 3044F HG A1C LEVEL LT 7.0%: CPT | Mod: CPTII,,, | Performed by: NURSE PRACTITIONER

## 2023-08-01 PROCEDURE — 3078F DIAST BP <80 MM HG: CPT | Mod: CPTII,,, | Performed by: NURSE PRACTITIONER

## 2023-08-01 PROCEDURE — 3074F SYST BP LT 130 MM HG: CPT | Mod: CPTII,,, | Performed by: NURSE PRACTITIONER

## 2023-08-01 PROCEDURE — 3044F PR MOST RECENT HEMOGLOBIN A1C LEVEL <7.0%: ICD-10-PCS | Mod: CPTII,,, | Performed by: NURSE PRACTITIONER

## 2023-08-01 PROCEDURE — 1160F PR REVIEW ALL MEDS BY PRESCRIBER/CLIN PHARMACIST DOCUMENTED: ICD-10-PCS | Mod: CPTII,,, | Performed by: NURSE PRACTITIONER

## 2023-08-01 PROCEDURE — 1159F PR MEDICATION LIST DOCUMENTED IN MEDICAL RECORD: ICD-10-PCS | Mod: CPTII,,, | Performed by: NURSE PRACTITIONER

## 2023-08-01 PROCEDURE — 3008F PR BODY MASS INDEX (BMI) DOCUMENTED: ICD-10-PCS | Mod: CPTII,,, | Performed by: NURSE PRACTITIONER

## 2023-08-01 PROCEDURE — 1160F RVW MEDS BY RX/DR IN RCRD: CPT | Mod: CPTII,,, | Performed by: NURSE PRACTITIONER

## 2023-08-01 PROCEDURE — 99212 OFFICE O/P EST SF 10 MIN: CPT | Mod: ,,, | Performed by: NURSE PRACTITIONER

## 2023-08-01 PROCEDURE — 3078F PR MOST RECENT DIASTOLIC BLOOD PRESSURE < 80 MM HG: ICD-10-PCS | Mod: CPTII,,, | Performed by: NURSE PRACTITIONER

## 2023-08-01 RX ORDER — HALOPERIDOL 1 MG/1
2 TABLET ORAL NIGHTLY
COMMUNITY
Start: 2023-07-26

## 2023-08-01 RX ORDER — TERCONAZOLE 8 MG/G
1 CREAM VAGINAL NIGHTLY
Qty: 20 G | Refills: 1 | Status: SHIPPED | OUTPATIENT
Start: 2023-08-01 | End: 2023-08-04

## 2023-08-01 NOTE — PROGRESS NOTES
Pt reports 2 sprays daily of Evamist relieved menopausal symptoms.  Currently taking Diflucan due to yeast infection after antibiotics.  Rx for Evamis 2 sprays daily and Terazol 3 sent in .

## 2023-08-01 NOTE — ASSESSMENT & PLAN NOTE
The patient complains of increasing restless leg movements with symptoms extending to the day.  Neurontin causes severe memory problems and did better without medication. Patient will continue current but discussed possible worsening of side effects with antiparkinson.

## 2023-08-01 NOTE — PROGRESS NOTES
Subjective:       Patient ID: Brenda Humphreys is a 58 y.o. female.    Chief Complaint: No chief complaint on file.    Patient presents with complaint of worsening of restless leg symptoms throughout the day and would like to see about adjusting treatment plan.      Review of Systems      Objective:      There were no vitals filed for this visit.    Physical Exam    Assessment/Plan:     1. Restless legs       No follow-ups on file.          Discussed the diagnosis, prognosis, plan of care, chronic nature of and indications for, risks and benefits of treatment for conditions.  Continue all medications as prescribed unless otherwise noted.   Call if patient develops other symptoms or if not better in 2-3 days and sooner if worse. Emphasized the importance of compliance with all recommendations, including medication use and timely follow up. Instructed to return as noted be or sooner if patient develops any other problems or if there are any other additional questions or concerns.

## 2023-08-04 DIAGNOSIS — E03.9 HYPOTHYROIDISM, UNSPECIFIED TYPE: ICD-10-CM

## 2023-08-07 RX ORDER — LEVOTHYROXINE SODIUM 112 UG/1
TABLET ORAL
Qty: 90 TABLET | Refills: 1 | Status: SHIPPED | OUTPATIENT
Start: 2023-08-07 | End: 2023-09-12

## 2023-08-16 DIAGNOSIS — J45.909 ASTHMA, UNSPECIFIED ASTHMA SEVERITY, UNSPECIFIED WHETHER COMPLICATED, UNSPECIFIED WHETHER PERSISTENT: ICD-10-CM

## 2023-08-16 DIAGNOSIS — E55.9 VITAMIN D DEFICIENCY: ICD-10-CM

## 2023-08-16 RX ORDER — MONTELUKAST SODIUM 10 MG/1
TABLET ORAL
Qty: 90 TABLET | Refills: 0 | Status: SHIPPED | OUTPATIENT
Start: 2023-08-16 | End: 2024-02-14

## 2023-08-16 RX ORDER — ERGOCALCIFEROL 1.25 MG/1
CAPSULE ORAL
Qty: 13 CAPSULE | Refills: 0 | Status: SHIPPED | OUTPATIENT
Start: 2023-08-16 | End: 2023-11-30

## 2023-08-21 DIAGNOSIS — R73.03 PREDIABETES: ICD-10-CM

## 2023-08-21 DIAGNOSIS — R52 PAIN: Primary | ICD-10-CM

## 2023-08-21 RX ORDER — MELOXICAM 15 MG/1
TABLET ORAL
Qty: 30 TABLET | Refills: 3 | Status: SHIPPED | OUTPATIENT
Start: 2023-08-21

## 2023-08-21 RX ORDER — METFORMIN HYDROCHLORIDE 500 MG/1
TABLET, EXTENDED RELEASE ORAL
Qty: 90 TABLET | Refills: 1 | Status: SHIPPED | OUTPATIENT
Start: 2023-08-21 | End: 2024-02-26

## 2023-09-06 DIAGNOSIS — G25.81 RESTLESS LEG SYNDROME: ICD-10-CM

## 2023-09-06 RX ORDER — ROPINIROLE 2 MG/1
TABLET, FILM COATED ORAL
Qty: 30 TABLET | Refills: 2 | Status: SHIPPED | OUTPATIENT
Start: 2023-09-06 | End: 2023-11-13 | Stop reason: SDUPTHER

## 2023-09-08 DIAGNOSIS — E11.9 CONTROLLED TYPE 2 DIABETES MELLITUS WITHOUT COMPLICATION, WITHOUT LONG-TERM CURRENT USE OF INSULIN: ICD-10-CM

## 2023-09-11 RX ORDER — TIRZEPATIDE 15 MG/.5ML
INJECTION, SOLUTION SUBCUTANEOUS
Qty: 4 PEN | Refills: 5 | Status: SHIPPED | OUTPATIENT
Start: 2023-09-11 | End: 2024-03-11

## 2023-09-12 ENCOUNTER — TELEPHONE (OUTPATIENT)
Dept: FAMILY MEDICINE | Facility: CLINIC | Age: 58
End: 2023-09-12

## 2023-09-12 ENCOUNTER — OFFICE VISIT (OUTPATIENT)
Dept: FAMILY MEDICINE | Facility: CLINIC | Age: 58
End: 2023-09-12
Payer: COMMERCIAL

## 2023-09-12 VITALS
TEMPERATURE: 99 F | DIASTOLIC BLOOD PRESSURE: 68 MMHG | WEIGHT: 198 LBS | BODY MASS INDEX: 36.44 KG/M2 | HEIGHT: 62 IN | HEART RATE: 83 BPM | SYSTOLIC BLOOD PRESSURE: 122 MMHG | OXYGEN SATURATION: 96 % | RESPIRATION RATE: 20 BRPM

## 2023-09-12 DIAGNOSIS — E03.9 HYPOTHYROIDISM, UNSPECIFIED TYPE: ICD-10-CM

## 2023-09-12 DIAGNOSIS — G47.33 OBSTRUCTIVE SLEEP APNEA: ICD-10-CM

## 2023-09-12 DIAGNOSIS — E11.9 CONTROLLED TYPE 2 DIABETES MELLITUS WITHOUT COMPLICATION, WITHOUT LONG-TERM CURRENT USE OF INSULIN: Primary | ICD-10-CM

## 2023-09-12 DIAGNOSIS — M50.30 DDD (DEGENERATIVE DISC DISEASE), CERVICAL: ICD-10-CM

## 2023-09-12 DIAGNOSIS — R53.82 CHRONIC FATIGUE: ICD-10-CM

## 2023-09-12 DIAGNOSIS — G25.81 RESTLESS LEGS: ICD-10-CM

## 2023-09-12 LAB — GLUCOSE SERPL-MCNC: 94 MG/DL (ref 70–110)

## 2023-09-12 PROCEDURE — 1159F MED LIST DOCD IN RCRD: CPT | Mod: CPTII,,, | Performed by: NURSE PRACTITIONER

## 2023-09-12 PROCEDURE — 3074F PR MOST RECENT SYSTOLIC BLOOD PRESSURE < 130 MM HG: ICD-10-PCS | Mod: CPTII,,, | Performed by: NURSE PRACTITIONER

## 2023-09-12 PROCEDURE — 3044F PR MOST RECENT HEMOGLOBIN A1C LEVEL <7.0%: ICD-10-PCS | Mod: CPTII,,, | Performed by: NURSE PRACTITIONER

## 2023-09-12 PROCEDURE — 3008F PR BODY MASS INDEX (BMI) DOCUMENTED: ICD-10-PCS | Mod: CPTII,,, | Performed by: NURSE PRACTITIONER

## 2023-09-12 PROCEDURE — 3066F PR DOCUMENTATION OF TREATMENT FOR NEPHROPATHY: ICD-10-PCS | Mod: CPTII,,, | Performed by: NURSE PRACTITIONER

## 2023-09-12 PROCEDURE — 3044F HG A1C LEVEL LT 7.0%: CPT | Mod: CPTII,,, | Performed by: NURSE PRACTITIONER

## 2023-09-12 PROCEDURE — 3066F NEPHROPATHY DOC TX: CPT | Mod: CPTII,,, | Performed by: NURSE PRACTITIONER

## 2023-09-12 PROCEDURE — 99212 OFFICE O/P EST SF 10 MIN: CPT | Mod: ,,, | Performed by: NURSE PRACTITIONER

## 2023-09-12 PROCEDURE — 3074F SYST BP LT 130 MM HG: CPT | Mod: CPTII,,, | Performed by: NURSE PRACTITIONER

## 2023-09-12 PROCEDURE — 3061F PR NEG MICROALBUMINURIA RESULT DOCUMENTED/REVIEW: ICD-10-PCS | Mod: CPTII,,, | Performed by: NURSE PRACTITIONER

## 2023-09-12 PROCEDURE — 1160F PR REVIEW ALL MEDS BY PRESCRIBER/CLIN PHARMACIST DOCUMENTED: ICD-10-PCS | Mod: CPTII,,, | Performed by: NURSE PRACTITIONER

## 2023-09-12 PROCEDURE — 3078F DIAST BP <80 MM HG: CPT | Mod: CPTII,,, | Performed by: NURSE PRACTITIONER

## 2023-09-12 PROCEDURE — 1159F PR MEDICATION LIST DOCUMENTED IN MEDICAL RECORD: ICD-10-PCS | Mod: CPTII,,, | Performed by: NURSE PRACTITIONER

## 2023-09-12 PROCEDURE — 3061F NEG MICROALBUMINURIA REV: CPT | Mod: CPTII,,, | Performed by: NURSE PRACTITIONER

## 2023-09-12 PROCEDURE — 99212 PR OFFICE/OUTPT VISIT, EST, LEVL II, 10-19 MIN: ICD-10-PCS | Mod: ,,, | Performed by: NURSE PRACTITIONER

## 2023-09-12 PROCEDURE — 3078F PR MOST RECENT DIASTOLIC BLOOD PRESSURE < 80 MM HG: ICD-10-PCS | Mod: CPTII,,, | Performed by: NURSE PRACTITIONER

## 2023-09-12 PROCEDURE — 3008F BODY MASS INDEX DOCD: CPT | Mod: CPTII,,, | Performed by: NURSE PRACTITIONER

## 2023-09-12 PROCEDURE — 1160F RVW MEDS BY RX/DR IN RCRD: CPT | Mod: CPTII,,, | Performed by: NURSE PRACTITIONER

## 2023-09-12 PROCEDURE — 82962 GLUCOSE BLOOD TEST: CPT | Mod: ,,, | Performed by: NURSE PRACTITIONER

## 2023-09-12 PROCEDURE — 82962 POCT GLUCOSE, HAND-HELD DEVICE: ICD-10-PCS | Mod: ,,, | Performed by: NURSE PRACTITIONER

## 2023-09-12 RX ORDER — LEVOTHYROXINE SODIUM 100 UG/1
100 TABLET ORAL
Qty: 90 TABLET | Refills: 0 | Status: SHIPPED | OUTPATIENT
Start: 2023-09-12 | End: 2023-12-11

## 2023-09-12 NOTE — PROGRESS NOTES
"Subjective:       Patient ID: Brenda Humphreys is a 58 y.o. female.    Chief Complaint: Follow-up (1 mth f/u and review labs)    HPI  Review of Systems   Constitutional:  Positive for fatigue.   Cardiovascular:  Positive for chest pain. Negative for leg swelling.   Musculoskeletal:  Positive for arthralgias, back pain and neck pain.   Neurological:  Positive for weakness and numbness.         Objective:      Vitals:    09/12/23 1539   BP: 122/68   Pulse: 83   Resp: 20   Temp: 98.7 °F (37.1 °C)   SpO2: 96%   Weight: 89.8 kg (198 lb)   Height: 5' 2" (1.575 m)       Physical Exam  Vitals and nursing note reviewed.   Constitutional:       General: She is not in acute distress.     Appearance: She is not ill-appearing.   HENT:      Head: Normocephalic.      Nose: Rhinorrhea present.      Mouth/Throat:      Mouth: Mucous membranes are moist.      Pharynx: Oropharynx is clear.   Eyes:      General: No scleral icterus.  Neck:      Vascular: No carotid bruit.   Cardiovascular:      Rate and Rhythm: Normal rate and regular rhythm.      Heart sounds: No murmur heard.     No friction rub. No gallop.   Pulmonary:      Effort: Pulmonary effort is normal. No respiratory distress.      Breath sounds: Normal breath sounds. No wheezing, rhonchi or rales.   Abdominal:      General: Bowel sounds are normal. There is no distension.      Palpations: Abdomen is soft. There is no mass.      Tenderness: There is no abdominal tenderness.   Musculoskeletal:      Cervical back: Neck supple. No tenderness. Decreased range of motion.   Neurological:      General: No focal deficit present.      Mental Status: She is alert.   Psychiatric:         Mood and Affect: Mood normal.         Assessment/Plan:     1. Controlled type 2 diabetes mellitus without complication, without long-term current use of insulin  -     POCT Glucose, Hand-Held Device    2. Restless legs    3. Hypothyroidism, unspecified type  Assessment & Plan:  Feeling more fatigued and " no symptoms hyperthyroidism at this time but has lost significant amount of weight we will decrease dose from 112-110 mcg daily on an empty stomach and recheck TSH free T3 and free T4 in 3 months.    Orders:  -     levothyroxine (SYNTHROID) 100 MCG tablet; Take 1 tablet (100 mcg total) by mouth before breakfast. Dose decrease  Dispense: 90 tablet; Refill: 0    4. Obstructive sleep apnea  Assessment & Plan:  Has decreased weight significantly from 260 to current 198 with the same CPAP settings.  Return to sleep provider Dr[facility]igua at [facility] for evaluation to see if needs to be readjusted with setting.      5. Chronic fatigue  Assessment & Plan:  Despite wearing the CPAP still feeling extreme fatigue daily.  Did as the Haldol low-dose to be able to help with the an irritating take and she did not find that it caused any increase in fatigue but monitor closely what happens if the CPAP is adjusted and still experiencing the fatigue.      6. DDD (degenerative disc disease), cervical  Assessment & Plan:  Continues to have more radiculopathy to the left side all the way to the hand but is also affecting the right area.  She does have a follow-up to returned to Dr Oliveira for further evaluation.          Follow up in about 3 months (around 12/12/2023).          Discussed the diagnosis, prognosis, plan of care, chronic nature of and indications for, risks and benefits of treatment for conditions.  Continue all medications as prescribed unless otherwise noted.   Call if patient develops other symptoms or if not better in 2-3 days and sooner if worse. Emphasized the importance of compliance with all recommendations, including medication use and timely follow up. Instructed to return as noted be or sooner if patient develops any other problems or if there are any other additional questions or concerns.

## 2023-09-12 NOTE — TELEPHONE ENCOUNTER
----- Message from Kiana Bingham DNP sent at 9/11/2023  5:30 PM CDT -----  Notify CBC CMP cholesterol hemoglobin A1c and urine micro all normal but TSH too low with weight loss.  Vitamin-D up to 64.  Current vitamin-D supplementation may need to decrease two hundred fifty 1000 every other week instead.  definitely decrease Synthroid from 112-100 mics daily can stop Glucophage but continue mounjaro, recheck tsh, bmp, vitamin d in 3 months, can wait 6 months to recheck hgb a1c,  cbc, cmp, flp

## 2023-09-12 NOTE — ASSESSMENT & PLAN NOTE
Continues to have more radiculopathy to the left side all the way to the hand but is also affecting the right area.  She does have a follow-up to returned to Dr Oliveira for further evaluation.   
Despite wearing the CPAP still feeling extreme fatigue daily.  Did as the Haldol low-dose to be able to help with the an irritating take and she did not find that it caused any increase in fatigue but monitor closely what happens if the CPAP is adjusted and still experiencing the fatigue.  
Feeling more fatigued and no symptoms hyperthyroidism at this time but has lost significant amount of weight we will decrease dose from 112-110 mcg daily on an empty stomach and recheck TSH free T3 and free T4 in 3 months.  
Has decreased weight significantly from 260 to current 198 with the same CPAP settings.  Return to sleep provider Dr. Lantigua at [facility] for evaluation to see if needs to be readjusted with setting.  
Home

## 2023-10-31 ENCOUNTER — HOSPITAL ENCOUNTER (OUTPATIENT)
Dept: RADIOLOGY | Facility: HOSPITAL | Age: 58
Discharge: HOME | End: 2023-10-31
Attending: NURSE PRACTITIONER
Payer: COMMERCIAL

## 2023-10-31 DIAGNOSIS — Z80.3 FAMILY HISTORY OF BREAST CANCER IN MOTHER: ICD-10-CM

## 2023-10-31 DIAGNOSIS — Z12.31 SCREENING MAMMOGRAM FOR BREAST CANCER: ICD-10-CM

## 2023-10-31 PROCEDURE — 77067 SCR MAMMO BI INCL CAD: CPT | Mod: TC

## 2023-11-10 DIAGNOSIS — G25.81 RESTLESS LEG SYNDROME: ICD-10-CM

## 2023-11-13 DIAGNOSIS — G25.81 RESTLESS LEG SYNDROME: ICD-10-CM

## 2023-11-13 RX ORDER — ROPINIROLE 2 MG/1
TABLET, FILM COATED ORAL
Qty: 30 TABLET | Refills: 2 | Status: SHIPPED | OUTPATIENT
Start: 2023-11-13

## 2023-11-13 RX ORDER — ROPINIROLE 2 MG/1
TABLET, FILM COATED ORAL
Qty: 30 TABLET | Refills: 2 | OUTPATIENT
Start: 2023-11-13

## 2023-11-13 RX ORDER — ROPINIROLE 2 MG/1
TABLET, FILM COATED ORAL
Qty: 90 TABLET | Refills: 0 | OUTPATIENT
Start: 2023-11-13

## 2023-11-19 DIAGNOSIS — F33.1 DEPRESSION, MAJOR, RECURRENT, MODERATE: Chronic | ICD-10-CM

## 2023-11-20 RX ORDER — VORTIOXETINE 20 MG/1
TABLET, FILM COATED ORAL
Qty: 30 TABLET | Refills: 2 | OUTPATIENT
Start: 2023-11-20

## 2023-11-30 DIAGNOSIS — E55.9 VITAMIN D DEFICIENCY: ICD-10-CM

## 2023-11-30 RX ORDER — ERGOCALCIFEROL 1.25 MG/1
CAPSULE ORAL
Qty: 13 CAPSULE | Refills: 0 | Status: SHIPPED | OUTPATIENT
Start: 2023-11-30

## 2023-12-06 ENCOUNTER — OFFICE VISIT (OUTPATIENT)
Dept: FAMILY MEDICINE | Facility: CLINIC | Age: 58
End: 2023-12-06
Payer: COMMERCIAL

## 2023-12-06 VITALS
SYSTOLIC BLOOD PRESSURE: 128 MMHG | HEIGHT: 62 IN | DIASTOLIC BLOOD PRESSURE: 64 MMHG | OXYGEN SATURATION: 100 % | TEMPERATURE: 98 F | WEIGHT: 202 LBS | BODY MASS INDEX: 37.17 KG/M2 | HEART RATE: 105 BPM | RESPIRATION RATE: 20 BRPM

## 2023-12-06 DIAGNOSIS — E78.2 MIXED HYPERLIPIDEMIA: ICD-10-CM

## 2023-12-06 DIAGNOSIS — E55.9 VITAMIN D DEFICIENCY: ICD-10-CM

## 2023-12-06 DIAGNOSIS — E03.9 ACQUIRED HYPOTHYROIDISM: ICD-10-CM

## 2023-12-06 DIAGNOSIS — G47.33 OBSTRUCTIVE SLEEP APNEA: ICD-10-CM

## 2023-12-06 DIAGNOSIS — R73.03 PREDIABETES: ICD-10-CM

## 2023-12-06 DIAGNOSIS — Z01.818 PREOPERATIVE CLEARANCE: Primary | ICD-10-CM

## 2023-12-06 LAB
EKG 12-LEAD: NORMAL
PR INTERVAL: NORMAL
PRT AXES: NORMAL
QRS DURATION: NORMAL
QT/QTC: NORMAL
VENTRICULAR RATE: NORMAL

## 2023-12-06 PROCEDURE — 3074F PR MOST RECENT SYSTOLIC BLOOD PRESSURE < 130 MM HG: ICD-10-PCS | Mod: CPTII,,, | Performed by: NURSE PRACTITIONER

## 2023-12-06 PROCEDURE — 3008F BODY MASS INDEX DOCD: CPT | Mod: CPTII,,, | Performed by: NURSE PRACTITIONER

## 2023-12-06 PROCEDURE — 3074F SYST BP LT 130 MM HG: CPT | Mod: CPTII,,, | Performed by: NURSE PRACTITIONER

## 2023-12-06 PROCEDURE — 3078F PR MOST RECENT DIASTOLIC BLOOD PRESSURE < 80 MM HG: ICD-10-PCS | Mod: CPTII,,, | Performed by: NURSE PRACTITIONER

## 2023-12-06 PROCEDURE — 1160F PR REVIEW ALL MEDS BY PRESCRIBER/CLIN PHARMACIST DOCUMENTED: ICD-10-PCS | Mod: CPTII,,, | Performed by: NURSE PRACTITIONER

## 2023-12-06 PROCEDURE — 1160F RVW MEDS BY RX/DR IN RCRD: CPT | Mod: CPTII,,, | Performed by: NURSE PRACTITIONER

## 2023-12-06 PROCEDURE — 1159F PR MEDICATION LIST DOCUMENTED IN MEDICAL RECORD: ICD-10-PCS | Mod: CPTII,,, | Performed by: NURSE PRACTITIONER

## 2023-12-06 PROCEDURE — 99214 PR OFFICE/OUTPT VISIT, EST, LEVL IV, 30-39 MIN: ICD-10-PCS | Mod: ,,, | Performed by: NURSE PRACTITIONER

## 2023-12-06 PROCEDURE — 3066F NEPHROPATHY DOC TX: CPT | Mod: CPTII,,, | Performed by: NURSE PRACTITIONER

## 2023-12-06 PROCEDURE — 3044F HG A1C LEVEL LT 7.0%: CPT | Mod: CPTII,,, | Performed by: NURSE PRACTITIONER

## 2023-12-06 PROCEDURE — 3066F PR DOCUMENTATION OF TREATMENT FOR NEPHROPATHY: ICD-10-PCS | Mod: CPTII,,, | Performed by: NURSE PRACTITIONER

## 2023-12-06 PROCEDURE — 3008F PR BODY MASS INDEX (BMI) DOCUMENTED: ICD-10-PCS | Mod: CPTII,,, | Performed by: NURSE PRACTITIONER

## 2023-12-06 PROCEDURE — 3061F NEG MICROALBUMINURIA REV: CPT | Mod: CPTII,,, | Performed by: NURSE PRACTITIONER

## 2023-12-06 PROCEDURE — 93000 POCT EKG 12-LEAD: ICD-10-PCS | Mod: ,,, | Performed by: NURSE PRACTITIONER

## 2023-12-06 PROCEDURE — 93000 ELECTROCARDIOGRAM COMPLETE: CPT | Mod: ,,, | Performed by: NURSE PRACTITIONER

## 2023-12-06 PROCEDURE — 3061F PR NEG MICROALBUMINURIA RESULT DOCUMENTED/REVIEW: ICD-10-PCS | Mod: CPTII,,, | Performed by: NURSE PRACTITIONER

## 2023-12-06 PROCEDURE — 3044F PR MOST RECENT HEMOGLOBIN A1C LEVEL <7.0%: ICD-10-PCS | Mod: CPTII,,, | Performed by: NURSE PRACTITIONER

## 2023-12-06 PROCEDURE — 99214 OFFICE O/P EST MOD 30 MIN: CPT | Mod: ,,, | Performed by: NURSE PRACTITIONER

## 2023-12-06 PROCEDURE — 3078F DIAST BP <80 MM HG: CPT | Mod: CPTII,,, | Performed by: NURSE PRACTITIONER

## 2023-12-06 PROCEDURE — 1159F MED LIST DOCD IN RCRD: CPT | Mod: CPTII,,, | Performed by: NURSE PRACTITIONER

## 2023-12-06 NOTE — ASSESSMENT & PLAN NOTE
Dr Varun De Leon, left carpal tunnel release. Cleared for surgery but hold mounjaro one week prior to surgery. Normal EKG, lab stable. Hold mounjaro one week before and metformin day prior to surgery and resume after drinking normally.

## 2023-12-06 NOTE — PROGRESS NOTES
"Subjective:       Patient ID: Brenda Humphreys is a 58 y.o. female.    Chief Complaint: Follow-up (Surgery clearance with Dr. De Leon. Needing labs and EKG. Will be having left carpal tunnel release.)    Patient is here for a surgical clearance for left carpal tunnel release with Dr. Carlos leigh.  She is taking all of her medications including the module row and her thyroid medication and does feel that they are working well she has been on these for some time and has not had any symptoms of reflux or nausea recently.  She is fair to hold the module row 1 week before surgery onset just to avoid potential rare side effect of aspiration during anesthesia.  No sign of infections feeling well never had any past trouble with anesthesia or surgery.      Review of Systems   Constitutional:  Negative for activity change and appetite change.   HENT:  Negative for nasal congestion, trouble swallowing and voice change.    Eyes: Negative.  Negative for visual disturbance.   Respiratory: Negative.  Negative for chest tightness, shortness of breath and wheezing.    Cardiovascular:  Negative for chest pain, palpitations and leg swelling.   Endocrine: Negative for cold intolerance, heat intolerance and polyuria.   Genitourinary:  Negative for bladder incontinence, difficulty urinating, flank pain and frequency.   Allergic/Immunologic: Negative for environmental allergies and food allergies.   Neurological:  Negative for vertigo, tremors, seizures, syncope, light-headedness, headaches and coordination difficulties.   Hematological:  Negative for adenopathy. Does not bruise/bleed easily.   Psychiatric/Behavioral:  Negative for agitation, sleep disturbance and suicidal ideas.          Objective:      Vitals:    12/06/23 0857   BP: 128/64   Pulse: 105   Resp: 20   Temp: 97.7 °F (36.5 °C)   SpO2: 100%   Weight: 91.6 kg (202 lb)   Height: 5' 2" (1.575 m)       Physical Exam  Vitals and nursing note reviewed.   Constitutional:       General: She " is not in acute distress.     Appearance: She is not ill-appearing.   HENT:      Head: Normocephalic and atraumatic.      Right Ear: Tympanic membrane and external ear normal.      Left Ear: External ear normal. A middle ear effusion is present.      Nose: Rhinorrhea present.      Mouth/Throat:      Mouth: Mucous membranes are moist.      Pharynx: Oropharynx is clear. Posterior oropharyngeal erythema present.   Eyes:      General: No scleral icterus.     Extraocular Movements: Extraocular movements intact.      Conjunctiva/sclera: Conjunctivae normal.      Pupils: Pupils are equal, round, and reactive to light.   Neck:      Vascular: No carotid bruit.   Cardiovascular:      Rate and Rhythm: Normal rate and regular rhythm.      Heart sounds: No murmur heard.     No friction rub. No gallop.   Pulmonary:      Effort: Pulmonary effort is normal. No respiratory distress.      Breath sounds: Normal breath sounds. No wheezing, rhonchi or rales.   Abdominal:      General: Abdomen is flat. Bowel sounds are normal. There is no distension.      Palpations: Abdomen is soft. There is no mass.      Tenderness: There is no abdominal tenderness.   Musculoskeletal:         General: Normal range of motion.      Cervical back: Normal range of motion and neck supple.   Skin:     General: Skin is warm and dry.   Neurological:      General: No focal deficit present.      Mental Status: She is alert and oriented to person, place, and time.   Psychiatric:         Mood and Affect: Mood normal.         Behavior: Behavior normal.         Assessment/Plan:     1. Preoperative clearance  Assessment & Plan:  Dr Varun De Leon, left carpal tunnel release. Cleared for surgery but hold mounjaro one week prior to surgery. Normal EKG, lab stable. Hold mounjaro one week before and metformin day prior to surgery and resume after drinking normally.       2. Obstructive sleep apnea  Assessment & Plan:  Wearing CPAP every night for at least 8 hours. Feeling  better with CPAP, notify anesthesia.       3. Acquired hypothyroidism  Assessment & Plan:  Will notify with results of lab draw when available. Continue current treatment until results available.         4. Mixed hyperlipidemia  Assessment & Plan:  Continue dietary changes, will notify with results when available.       5. Prediabetes  Overview:  Hold metformin and mounjaro. Prior to surgery.          No follow-ups on file.          Discussed the diagnosis, prognosis, plan of care, chronic nature of and indications for, risks and benefits of treatment for conditions.  Continue all medications as prescribed unless otherwise noted.   Call if patient develops other symptoms or if not better in 2-3 days and sooner if worse. Emphasized the importance of compliance with all recommendations, including medication use and timely follow up. Instructed to return as noted be or sooner if patient develops any other problems or if there are any other additional questions or concerns.

## 2023-12-08 LAB
25(OH)D3+25(OH)D2 SERPL-MCNC: 55.1 NG/ML (ref 30–100)
BASOPHILS # BLD AUTO: 0.1 X10E3/UL (ref 0–0.2)
BASOPHILS NFR BLD AUTO: 1 %
BUN SERPL-MCNC: 14 MG/DL (ref 6–24)
BUN/CREAT SERPL: 15 (ref 9–23)
CALCIUM SERPL-MCNC: 9.7 MG/DL (ref 8.7–10.2)
CHLORIDE SERPL-SCNC: 103 MMOL/L (ref 96–106)
CO2 SERPL-SCNC: 22 MMOL/L (ref 20–29)
CREAT SERPL-MCNC: 0.91 MG/DL (ref 0.57–1)
EOSINOPHIL # BLD AUTO: 0.1 X10E3/UL (ref 0–0.4)
EOSINOPHIL NFR BLD AUTO: 2 %
ERYTHROCYTE [DISTWIDTH] IN BLOOD BY AUTOMATED COUNT: 13.3 % (ref 11.7–15.4)
EST. GFR  (NO RACE VARIABLE): 73 ML/MIN/1.73
GLUCOSE SERPL-MCNC: 93 MG/DL (ref 70–99)
HCT VFR BLD AUTO: 35.1 % (ref 34–46.6)
HGB BLD-MCNC: 11.7 G/DL (ref 11.1–15.9)
IMM GRANULOCYTES NFR BLD AUTO: 0 %
LYMPHOCYTES # BLD AUTO: 1.5 X10E3/UL (ref 0.7–3.1)
LYMPHOCYTES NFR BLD AUTO: 34 %
MCH RBC QN AUTO: 27.8 PG (ref 26.6–33)
MCHC RBC AUTO-ENTMCNC: 33.3 G/DL (ref 31.5–35.7)
MCV RBC AUTO: 83 FL (ref 79–97)
MONOCYTES # BLD AUTO: 0.3 X10E3/UL (ref 0.1–0.9)
MONOCYTES NFR BLD AUTO: 7 %
NEUTROPHILS # BLD AUTO: 2.5 X10E3/UL (ref 1.4–7)
NEUTROPHILS NFR BLD AUTO: 56 %
PLATELET # BLD AUTO: 224 X10E3/UL (ref 150–450)
POTASSIUM SERPL-SCNC: 4.2 MMOL/L (ref 3.5–5.2)
RBC # BLD AUTO: 4.21 X10E6/UL (ref 3.77–5.28)
SODIUM SERPL-SCNC: 140 MMOL/L (ref 134–144)
T4 FREE SERPL-MCNC: 1.56 NG/DL (ref 0.82–1.77)
TSH SERPL DL<=0.005 MIU/L-ACNC: 0.27 UIU/ML (ref 0.45–4.5)
WBC # BLD AUTO: 4.5 X10E3/UL (ref 3.4–10.8)

## 2023-12-11 ENCOUNTER — TELEPHONE (OUTPATIENT)
Dept: FAMILY MEDICINE | Facility: CLINIC | Age: 58
End: 2023-12-11

## 2023-12-11 DIAGNOSIS — E03.9 ACQUIRED HYPOTHYROIDISM: Primary | ICD-10-CM

## 2023-12-11 RX ORDER — LEVOTHYROXINE SODIUM 88 UG/1
88 TABLET ORAL
Qty: 30 TABLET | Refills: 2 | Status: SHIPPED | OUTPATIENT
Start: 2023-12-11 | End: 2024-02-14

## 2023-12-11 NOTE — TELEPHONE ENCOUNTER
Pt notified. recheck tsh, free t4 in 3 months. Recheck cmp, flp, cbc, tsh, vitamin D in 6 months with insulin

## 2023-12-11 NOTE — TELEPHONE ENCOUNTER
----- Message from Kiana Bingham, DOUGLAS sent at 12/11/2023  6:08 AM CST -----  Notify tsh even lower with weith loss, but T4 & vitamin D, cbc, bmp good, reorder and concitnue vitamin D 50,000, decrease synthroid to 88 mcg, recheck tsh, free t4 in 3 months. Recheck cmp, flp, cbc, tsh, vitamin D in 6 months with insulin

## 2023-12-11 NOTE — TELEPHONE ENCOUNTER
recheck tsh, free t4 in 3 months. Recheck cmp, flp, cbc, tsh, vitamin D in 6 months with insulin

## 2024-01-31 ENCOUNTER — PATIENT OUTREACH (OUTPATIENT)
Dept: ADMINISTRATIVE | Facility: HOSPITAL | Age: 59
End: 2024-01-31
Payer: COMMERCIAL

## 2024-01-31 NOTE — PROGRESS NOTES
Population Health Chart Review & Patient Outreach Details    Health Maintenance Topics Addressed and Outreach Outcomes / Actions Taken:            Diabetic Eye Exam [x]  Left message    []  Eye Camera Scheduled or Optometry/Ophthalmology Referral Placed    []  External Records Requested & Care Team Updated if Applicable    []  External Records Uploaded, Care Team Updated, & History Updated if Applicable    []  Patient Declined Scheduling Eye Exam    []  Patient Will Schedule with External Provider & Care Team Updated if Applicable          Augusta Hernandez MA - Panel Care Coordinator

## 2024-02-14 DIAGNOSIS — E03.9 ACQUIRED HYPOTHYROIDISM: ICD-10-CM

## 2024-02-14 DIAGNOSIS — J45.909 ASTHMA, UNSPECIFIED ASTHMA SEVERITY, UNSPECIFIED WHETHER COMPLICATED, UNSPECIFIED WHETHER PERSISTENT: ICD-10-CM

## 2024-02-14 RX ORDER — MONTELUKAST SODIUM 10 MG/1
TABLET ORAL
Qty: 90 TABLET | Refills: 0 | Status: SHIPPED | OUTPATIENT
Start: 2024-02-14 | End: 2024-04-15 | Stop reason: SDUPTHER

## 2024-02-14 RX ORDER — LEVOTHYROXINE SODIUM 88 UG/1
TABLET ORAL
Qty: 30 TABLET | Refills: 2 | Status: SHIPPED | OUTPATIENT
Start: 2024-02-14 | End: 2024-04-15 | Stop reason: SDUPTHER

## 2024-02-26 DIAGNOSIS — R73.03 PREDIABETES: ICD-10-CM

## 2024-02-26 RX ORDER — METFORMIN HYDROCHLORIDE 500 MG/1
TABLET, EXTENDED RELEASE ORAL
Qty: 90 TABLET | Refills: 1 | Status: SHIPPED | OUTPATIENT
Start: 2024-02-26 | End: 2024-04-15 | Stop reason: SDUPTHER

## 2024-03-11 DIAGNOSIS — E11.9 CONTROLLED TYPE 2 DIABETES MELLITUS WITHOUT COMPLICATION, WITHOUT LONG-TERM CURRENT USE OF INSULIN: ICD-10-CM

## 2024-03-11 RX ORDER — TIRZEPATIDE 15 MG/.5ML
INJECTION, SOLUTION SUBCUTANEOUS
Qty: 4 PEN | Refills: 5 | Status: SHIPPED | OUTPATIENT
Start: 2024-03-11

## 2024-03-20 ENCOUNTER — OFFICE VISIT (OUTPATIENT)
Dept: FAMILY MEDICINE | Facility: CLINIC | Age: 59
End: 2024-03-20
Payer: COMMERCIAL

## 2024-03-20 DIAGNOSIS — E11.9 COMPREHENSIVE DIABETIC FOOT EXAMINATION, TYPE 2 DM, ENCOUNTER FOR: ICD-10-CM

## 2024-03-20 DIAGNOSIS — E11.9 CONTROLLED TYPE 2 DIABETES MELLITUS WITHOUT COMPLICATION, WITHOUT LONG-TERM CURRENT USE OF INSULIN: Primary | ICD-10-CM

## 2024-03-20 DIAGNOSIS — E11.9 TYPE 2 DIABETES MELLITUS WITHOUT COMPLICATION, WITHOUT LONG-TERM CURRENT USE OF INSULIN: ICD-10-CM

## 2024-03-20 DIAGNOSIS — S21.009A INCISIONAL BREAST WOUND: ICD-10-CM

## 2024-03-20 LAB
GLUCOSE SERPL-MCNC: 79 MG/DL (ref 70–110)
LEFT EYE DM RETINOPATHY: NEGATIVE
RIGHT EYE DM RETINOPATHY: NEGATIVE

## 2024-03-20 PROCEDURE — 99213 OFFICE O/P EST LOW 20 MIN: CPT | Mod: ,,, | Performed by: NURSE PRACTITIONER

## 2024-03-20 PROCEDURE — 1159F MED LIST DOCD IN RCRD: CPT | Mod: CPTII,,, | Performed by: NURSE PRACTITIONER

## 2024-03-20 PROCEDURE — 1160F RVW MEDS BY RX/DR IN RCRD: CPT | Mod: CPTII,,, | Performed by: NURSE PRACTITIONER

## 2024-03-20 PROCEDURE — 82962 GLUCOSE BLOOD TEST: CPT | Mod: ,,, | Performed by: NURSE PRACTITIONER

## 2024-03-20 NOTE — ASSESSMENT & PLAN NOTE
Wrist with 3 stitches removed erythema or inflammation.  Reminded patient to check with dermatologist for pathology and notify if any sign of infection occurs.

## 2024-03-20 NOTE — PROGRESS NOTES
Subjective:       Patient ID: Brenda Humphreys is a 59 y.o. female.    Chief Complaint: Suture / Staple Removal (Had biopsy of rt breast from Dr. Kent.)    The patient presents for follow-up with right blood breast biopsy with dermatologist and needs stitch removal.  No erythema or symptoms of inflammation..  Not checking home blood sugar but has been noting that her weight is stable and no signs of hypoglycemia denies any foot pain or problems at this time.    Suture / Staple Removal  The sutures were placed 7 to 10 days ago. She tried a wound recheck since the wound repair. There has been no drainage from the wound. There is no redness present. There is no swelling present. There is no pain present. She has no difficulty moving the affected extremity or digit.     Review of Systems   Constitutional:  Negative for activity change and appetite change.   HENT:  Negative for nasal congestion, trouble swallowing and voice change.    Eyes: Negative.  Negative for visual disturbance.   Respiratory: Negative.  Negative for chest tightness, shortness of breath and wheezing.    Cardiovascular:  Negative for chest pain, palpitations and leg swelling.   Endocrine: Negative for cold intolerance, heat intolerance and polyuria.   Genitourinary:  Negative for bladder incontinence, difficulty urinating, flank pain and frequency.   Integumentary:  Positive for color change and mole/lesion.   Allergic/Immunologic: Negative for environmental allergies and food allergies.   Neurological:  Negative for vertigo, tremors, seizures, syncope, light-headedness, headaches and coordination difficulties.   Hematological:  Negative for adenopathy. Does not bruise/bleed easily.   Psychiatric/Behavioral:  Negative for agitation, sleep disturbance and suicidal ideas.          Objective:      There were no vitals filed for this visit.    Physical Exam  Vitals and nursing note reviewed. Exam conducted with a chaperone present.   Constitutional:        General: She is not in acute distress.     Appearance: She is not ill-appearing.   HENT:      Head: Normocephalic and atraumatic.      Right Ear: Tympanic membrane and external ear normal.      Left Ear: External ear normal. A middle ear effusion is present.      Nose: No rhinorrhea.      Mouth/Throat:      Mouth: Mucous membranes are moist.      Pharynx: Oropharynx is clear. No posterior oropharyngeal erythema.   Eyes:      General: No scleral icterus.     Extraocular Movements: Extraocular movements intact.      Conjunctiva/sclera: Conjunctivae normal.      Pupils: Pupils are equal, round, and reactive to light.   Neck:      Vascular: No carotid bruit.   Cardiovascular:      Rate and Rhythm: Normal rate and regular rhythm.      Pulses: Normal pulses.           Dorsalis pedis pulses are 2+ on the right side and 2+ on the left side.        Posterior tibial pulses are 2+ on the right side and 2+ on the left side.      Heart sounds: Normal heart sounds. No murmur heard.     No friction rub. No gallop.   Pulmonary:      Effort: Pulmonary effort is normal. No respiratory distress.      Breath sounds: Normal breath sounds. No wheezing, rhonchi or rales.   Chest:   Breasts:     Right: Skin change present.      Left: Normal.          Comments: Three stitches removed with no drainage or irritation tolerated well cleanse with sterile saline.   Abdominal:      General: Abdomen is flat. Bowel sounds are normal. There is no distension.      Palpations: Abdomen is soft. There is no mass.      Tenderness: There is no abdominal tenderness.   Musculoskeletal:         General: Normal range of motion.      Cervical back: Normal range of motion and neck supple.   Feet:      Right foot:      Protective Sensation: 10 sites tested.  10 sites sensed.      Skin integrity: Skin integrity normal.      Toenail Condition: Right toenails are normal.      Left foot:      Protective Sensation: 10 sites tested.  10 sites sensed.      Skin  integrity: Skin integrity normal.      Toenail Condition: Left toenails are normal.   Skin:     General: Skin is warm and dry.   Neurological:      General: No focal deficit present.      Mental Status: She is alert and oriented to person, place, and time.   Psychiatric:         Mood and Affect: Mood normal.         Behavior: Behavior normal.         Assessment/Plan:     1. Controlled type 2 diabetes mellitus without complication, without long-term current use of insulin  -     POCT Glucose, Hand-Held Device    2. Comprehensive diabetic foot examination, type 2 DM, encounter for  Assessment & Plan:  Normal foot exam. Good shoe fit. Foot safety discussed The current medical regimen is effective;  continue present plan and medications.  .       3. Incisional breast wound  Assessment & Plan:  Wrist with 3 stitches removed erythema or inflammation.  Reminded patient to check with dermatologist for pathology and notify if any sign of infection occurs.      4. Type 2 diabetes mellitus without complication, without long-term current use of insulin  Overview:  Hold metformin and mounjaro. Prior to surgery.     Assessment & Plan:  Continue mounjaro 15 mg but may take 12.5 if supply problems. The current medical regimen is effective;  continue present plan and medications.           No follow-ups on file.          Discussed the diagnosis, prognosis, plan of care, chronic nature of and indications for, risks and benefits of treatment for conditions.  Continue all medications as prescribed unless otherwise noted.   Call if patient develops other symptoms or if not better in 2-3 days and sooner if worse. Emphasized the importance of compliance with all recommendations, including medication use and timely follow up. Instructed to return as noted be or sooner if patient develops any other problems or if there are any other additional questions or concerns.

## 2024-03-20 NOTE — ASSESSMENT & PLAN NOTE
Continue mounjaro 15 mg but may take 12.5 if supply problems. The current medical regimen is effective;  continue present plan and medications.

## 2024-03-20 NOTE — ASSESSMENT & PLAN NOTE
Normal foot exam. Good shoe fit. Foot safety discussed The current medical regimen is effective;  continue present plan and medications.  .

## 2024-03-21 ENCOUNTER — DOCUMENTATION ONLY (OUTPATIENT)
Dept: FAMILY MEDICINE | Facility: CLINIC | Age: 59
End: 2024-03-21
Payer: COMMERCIAL

## 2024-04-15 ENCOUNTER — OFFICE VISIT (OUTPATIENT)
Dept: FAMILY MEDICINE | Facility: CLINIC | Age: 59
End: 2024-04-15
Payer: COMMERCIAL

## 2024-04-15 VITALS
HEIGHT: 62 IN | HEART RATE: 98 BPM | DIASTOLIC BLOOD PRESSURE: 71 MMHG | OXYGEN SATURATION: 99 % | SYSTOLIC BLOOD PRESSURE: 122 MMHG | WEIGHT: 212 LBS | TEMPERATURE: 97 F | RESPIRATION RATE: 20 BRPM | BODY MASS INDEX: 39.01 KG/M2

## 2024-04-15 DIAGNOSIS — F33.1 DEPRESSION, MAJOR, RECURRENT, MODERATE: ICD-10-CM

## 2024-04-15 DIAGNOSIS — G25.81 RESTLESS LEG SYNDROME: ICD-10-CM

## 2024-04-15 DIAGNOSIS — E66.01 MORBID (SEVERE) OBESITY DUE TO EXCESS CALORIES: ICD-10-CM

## 2024-04-15 DIAGNOSIS — G25.81 RESTLESS LEGS: ICD-10-CM

## 2024-04-15 DIAGNOSIS — J45.909 ASTHMA, UNSPECIFIED ASTHMA SEVERITY, UNSPECIFIED WHETHER COMPLICATED, UNSPECIFIED WHETHER PERSISTENT: ICD-10-CM

## 2024-04-15 DIAGNOSIS — E03.4 HYPOTHYROIDISM DUE TO ACQUIRED ATROPHY OF THYROID: ICD-10-CM

## 2024-04-15 DIAGNOSIS — E55.9 VITAMIN D DEFICIENCY: ICD-10-CM

## 2024-04-15 DIAGNOSIS — E11.9 TYPE 2 DIABETES MELLITUS WITHOUT COMPLICATION, WITHOUT LONG-TERM CURRENT USE OF INSULIN: Primary | ICD-10-CM

## 2024-04-15 DIAGNOSIS — E03.9 ACQUIRED HYPOTHYROIDISM: ICD-10-CM

## 2024-04-15 DIAGNOSIS — K21.9 GASTROESOPHAGEAL REFLUX DISEASE, UNSPECIFIED WHETHER ESOPHAGITIS PRESENT: ICD-10-CM

## 2024-04-15 DIAGNOSIS — E11.9 COMPREHENSIVE DIABETIC FOOT EXAMINATION, TYPE 2 DM, ENCOUNTER FOR: ICD-10-CM

## 2024-04-15 DIAGNOSIS — E78.2 MIXED HYPERLIPIDEMIA: ICD-10-CM

## 2024-04-15 DIAGNOSIS — M50.30 DDD (DEGENERATIVE DISC DISEASE), CERVICAL: ICD-10-CM

## 2024-04-15 PROCEDURE — 2023F DILAT RTA XM W/O RTNOPTHY: CPT | Mod: CPTII,,, | Performed by: NURSE PRACTITIONER

## 2024-04-15 PROCEDURE — 3078F DIAST BP <80 MM HG: CPT | Mod: CPTII,,, | Performed by: NURSE PRACTITIONER

## 2024-04-15 PROCEDURE — 3044F HG A1C LEVEL LT 7.0%: CPT | Mod: CPTII,,, | Performed by: NURSE PRACTITIONER

## 2024-04-15 PROCEDURE — 3074F SYST BP LT 130 MM HG: CPT | Mod: CPTII,,, | Performed by: NURSE PRACTITIONER

## 2024-04-15 PROCEDURE — 1160F RVW MEDS BY RX/DR IN RCRD: CPT | Mod: CPTII,,, | Performed by: NURSE PRACTITIONER

## 2024-04-15 PROCEDURE — 1159F MED LIST DOCD IN RCRD: CPT | Mod: CPTII,,, | Performed by: NURSE PRACTITIONER

## 2024-04-15 PROCEDURE — 3008F BODY MASS INDEX DOCD: CPT | Mod: CPTII,,, | Performed by: NURSE PRACTITIONER

## 2024-04-15 PROCEDURE — 99214 OFFICE O/P EST MOD 30 MIN: CPT | Mod: ,,, | Performed by: NURSE PRACTITIONER

## 2024-04-15 RX ORDER — LEVOTHYROXINE SODIUM 88 UG/1
88 TABLET ORAL
Qty: 90 TABLET | Refills: 1 | Status: SHIPPED | OUTPATIENT
Start: 2024-04-15

## 2024-04-15 RX ORDER — ROPINIROLE 2 MG/1
TABLET, FILM COATED ORAL
Qty: 30 TABLET | Refills: 2 | Status: SHIPPED | OUTPATIENT
Start: 2024-04-15

## 2024-04-15 RX ORDER — ROSUVASTATIN CALCIUM 10 MG/1
10 TABLET, COATED ORAL DAILY
Qty: 90 TABLET | Refills: 1 | Status: SHIPPED | OUTPATIENT
Start: 2024-04-15

## 2024-04-15 RX ORDER — TIRZEPATIDE 12.5 MG/.5ML
12.5 INJECTION, SOLUTION SUBCUTANEOUS
Qty: 4 PEN | Refills: 2 | Status: SHIPPED | OUTPATIENT
Start: 2024-04-15

## 2024-04-15 RX ORDER — METFORMIN HYDROCHLORIDE 500 MG/1
500 TABLET, EXTENDED RELEASE ORAL DAILY
Qty: 90 TABLET | Refills: 1 | Status: SHIPPED | OUTPATIENT
Start: 2024-04-15

## 2024-04-15 RX ORDER — HYDROGEN PEROXIDE 3 %
40 SOLUTION, NON-ORAL MISCELLANEOUS
Qty: 90 CAPSULE | Refills: 1 | Status: SHIPPED | OUTPATIENT
Start: 2024-04-15

## 2024-04-15 RX ORDER — CYCLOBENZAPRINE HCL 10 MG
10 TABLET ORAL DAILY PRN
Qty: 90 TABLET | Refills: 1 | Status: SHIPPED | OUTPATIENT
Start: 2024-04-15

## 2024-04-15 RX ORDER — ERGOCALCIFEROL 1.25 MG/1
50000 CAPSULE ORAL
Qty: 13 CAPSULE | Refills: 1 | Status: SHIPPED | OUTPATIENT
Start: 2024-04-15 | End: 2024-06-19

## 2024-04-15 RX ORDER — MONTELUKAST SODIUM 10 MG/1
TABLET ORAL
Qty: 90 TABLET | Refills: 0 | Status: SHIPPED | OUTPATIENT
Start: 2024-04-15

## 2024-04-15 RX ORDER — TIRZEPATIDE 12.5 MG/.5ML
12.5 INJECTION, SOLUTION SUBCUTANEOUS
COMMUNITY
End: 2024-04-15 | Stop reason: SDUPTHER

## 2024-04-15 NOTE — ASSESSMENT & PLAN NOTE
Synthroid 88 mcg daily on empty stomach. Recheck tsh, in 6 months. But hold biotin 4 days before next lab draw.

## 2024-04-15 NOTE — PROGRESS NOTES
"SUBJECTIVE:  Brenda Humphreys is a 59 y.o. female here alone for wellness      HPI  Pt is in clinic with wellness visit. Had labs on 04/03/2024. Mammo was on 11/1/23. Which was normal. Pt is utd on colonoscopy. Was done in 2018. No polyps. Pt isn't due until 2028. States that she had it done in [place]. States that she is good for 10 years. Had eye exam on 3/20/24. Pt refuses pcv vaccine today. Pt states that everything is good. She is in clinic for results. LIU Cruz for medication. Using Haldol 2 mg at hs resting better. Still with restless leg despite medication and arm now at times. Ultram between 7:30-8 and able to rest. Still taking 3-4 hours to fall asleep. Seeing [facility] May 1st to adjust medications.     Cornelios allergies, medications, history, and problem list were updated as appropriate.    Review of Systems   Constitutional:  Positive for fatigue. Negative for appetite change.   HENT:  Positive for postnasal drip.    Eyes: Negative.    Respiratory:  Positive for cough. Negative for wheezing.    Cardiovascular: Negative.    Gastrointestinal:  Negative for blood in stool, constipation, diarrhea, nausea and vomiting.   Endocrine: Positive for cold intolerance.   Genitourinary: Negative.    Musculoskeletal:  Positive for neck pain.   Allergic/Immunologic: Positive for environmental allergies.   Neurological:  Positive for numbness. Negative for headaches.   Hematological: Negative.    Psychiatric/Behavioral:  Positive for dysphoric mood (more sadness than anxiety, seeing PNP in 2 weeks.) and sleep disturbance. Negative for suicidal ideas. The patient is nervous/anxious.       A comprehensive review of symptoms was completed and negative except as noted above.    OBJECTIVE:  Vital signs  Vitals:    04/15/24 1304   BP: 122/71   BP Location: Left arm   Patient Position: Sitting   Pulse: 98   Resp: 20   Temp: 96.9 °F (36.1 °C)   SpO2: 99%   Weight: 96.2 kg (212 lb)   Height: 5' 2" (1.575 m)    "     Physical Exam  Vitals and nursing note reviewed. Exam conducted with a chaperone present.   Constitutional:       General: She is not in acute distress.     Appearance: She is not ill-appearing.   HENT:      Head: Normocephalic and atraumatic.      Right Ear: Tympanic membrane and external ear normal.      Left Ear: External ear normal. A middle ear effusion is present.      Nose: No rhinorrhea.      Mouth/Throat:      Mouth: Mucous membranes are moist.      Pharynx: Oropharynx is clear. No posterior oropharyngeal erythema.   Eyes:      General: No scleral icterus.     Extraocular Movements: Extraocular movements intact.      Conjunctiva/sclera: Conjunctivae normal.      Pupils: Pupils are equal, round, and reactive to light.   Neck:      Vascular: No carotid bruit.   Cardiovascular:      Rate and Rhythm: Normal rate and regular rhythm.      Pulses: Normal pulses.           Dorsalis pedis pulses are 2+ on the right side and 2+ on the left side.        Posterior tibial pulses are 2+ on the right side and 2+ on the left side.      Heart sounds: Normal heart sounds. No murmur heard.     No friction rub. No gallop.   Pulmonary:      Effort: Pulmonary effort is normal. No respiratory distress.      Breath sounds: Normal breath sounds. No wheezing, rhonchi or rales.   Chest:   Breasts:     Right: Skin change present.      Left: Normal.          Comments: Three stitches removed with no drainage or irritation tolerated well cleanse with sterile saline.   Abdominal:      General: Abdomen is flat. Bowel sounds are normal. There is no distension.      Palpations: Abdomen is soft. There is no mass.      Tenderness: There is no abdominal tenderness.   Musculoskeletal:         General: Normal range of motion.      Cervical back: Normal range of motion and neck supple.   Feet:      Right foot:      Protective Sensation: 10 sites tested.  10 sites sensed.      Skin integrity: Skin integrity normal.      Toenail Condition: Right  toenails are normal.      Left foot:      Protective Sensation: 10 sites tested.  10 sites sensed.      Skin integrity: Skin integrity normal.      Toenail Condition: Left toenails are normal.   Skin:     General: Skin is warm and dry.   Neurological:      General: No focal deficit present.      Mental Status: She is alert and oriented to person, place, and time.   Psychiatric:         Mood and Affect: Mood normal.         Behavior: Behavior normal.          No visits with results within 1 Day(s) from this visit.   Latest known visit with results is:   Appointment on 04/04/2024   Component Date Value Ref Range Status    WBC 04/03/2024 5.2  3.4 - 10.8 x10E3/uL Final    RBC 04/03/2024 4.35  3.77 - 5.28 x10E6/uL Final    Hemoglobin 04/03/2024 12.2  11.1 - 15.9 g/dL Final    Hematocrit 04/03/2024 36.2  34.0 - 46.6 % Final    MCV 04/03/2024 83  79 - 97 fL Final    MCH 04/03/2024 28.0  26.6 - 33.0 pg Final    MCHC 04/03/2024 33.7  31.5 - 35.7 g/dL Final    RDW 04/03/2024 12.7  11.7 - 15.4 % Final    Platelets 04/03/2024 256  150 - 450 x10E3/uL Final    Neutrophils 04/03/2024 60  Not Estab. % Final    Lymphs 04/03/2024 30  Not Estab. % Final    Monocytes 04/03/2024 7  Not Estab. % Final    Eos 04/03/2024 2  Not Estab. % Final    Basos 04/03/2024 1  Not Estab. % Final    Neutrophils (Absolute) 04/03/2024 3.1  1.4 - 7.0 x10E3/uL Final    Lymphs (Absolute) 04/03/2024 1.6  0.7 - 3.1 x10E3/uL Final    Monocytes(Absolute) 04/03/2024 0.3  0.1 - 0.9 x10E3/uL Final    Eos (Absolute) 04/03/2024 0.1  0.0 - 0.4 x10E3/uL Final    Baso (Absolute) 04/03/2024 0.1  0.0 - 0.2 x10E3/uL Final    Immature Granulocytes 04/03/2024 0  Not Estab. % Final    Hemoglobin A1c 04/03/2024 5.2  4.8 - 5.6 % Final    Comment:          Prediabetes: 5.7 - 6.4           Diabetes: >6.4           Glycemic control for adults with diabetes: <7.0            ASSESSMENT/PLAN:    1. Type 2 diabetes mellitus without complication, without long-term current use of  insulin  Overview:  Hold metformin and mounjaro. Prior to surgery.     Assessment & Plan:  Hemoglobin A1c has decreased to 5.2 very well-controlled with current medication including weight loss metformin 500 daily and module row 15 mg weekly.  No side effects with medication.  The current medical regimen is effective;  continue present plan and medications.      Orders:  -     rosuvastatin (CRESTOR) 10 MG tablet; Take 1 tablet (10 mg total) by mouth once daily.  Dispense: 90 tablet; Refill: 1  -     tirzepatide (MOUNJARO) 12.5 mg/0.5 mL PnIj; Inject 12.5 mg into the skin every 7 days. When no shortage of mounjaro 15 mg resume this dose  Dispense: 4 Pen; Refill: 2  -     metFORMIN (GLUCOPHAGE-XR) 500 MG ER 24hr tablet; Take 1 tablet (500 mg total) by mouth once daily.  Dispense: 90 tablet; Refill: 1    2. Comprehensive diabetic foot examination, type 2 DM, encounter for  Assessment & Plan:  Synthroid 88 mcg q.d. on an empty stomach.  Therapeutic TSH and free T4 continue current medication recheck TSH in six-month notify any changes or problems.      3. Morbid (severe) obesity due to excess calories    4. Depression, major, recurrent, moderate  Assessment & Plan:  Taking Haldol 2 mg nightly q.h.s. with Klonopin 0.5 mg b.i.d. Trintellix 20 mg p.o. Q D with trazodone 300 mg q.h.s. she is meeting regularly with the mental health prescriber and feels that the medication is working well to control symptoms.  Negative for any bingeing we will suicide ideation adverse effects of medication.      5. Restless legs  Assessment & Plan:  Requip 2 mg 1 p.o. q.d. for restless legs is improving the symptoms and resting better at night with her other medications. The current medical regimen is effective;  continue present plan and medications.        6. Mixed hyperlipidemia  Assessment & Plan:   current recommendation with risk factors is less than 70.  Standard of care recommend  statin with either Crestor 10 mg can take with  CoQ10 100 mg. . Recheck cmp, flp, in 6 months.     Orders:  -     rosuvastatin (CRESTOR) 10 MG tablet; Take 1 tablet (10 mg total) by mouth once daily.  Dispense: 90 tablet; Refill: 1    7. DDD (degenerative disc disease), cervical  Assessment & Plan:  Taking flexaril 10 mg qhs, add stretching exercises. TENS, if severe Dr Oliveira has rx Ultram prn severe pain.       8. Hypothyroidism due to acquired atrophy of thyroid  Assessment & Plan:  Synthroid 88 mcg daily on empty stomach. Recheck tsh, in 6 months. But hold biotin 4 days before next lab draw.       9. Restless leg syndrome  -     rOPINIRole (REQUIP) 2 MG tablet; TAKE ONE(1) TAB BY MOUTH EVERY NIGHT AT BEDTIME FOR RESTLESS LEGS  Dispense: 30 tablet; Refill: 2    10. Asthma, unspecified asthma severity, unspecified whether complicated, unspecified whether persistent  -     montelukast (SINGULAIR) 10 mg tablet; TAKE ONE(1) TAB BY MOUTH EVERY NIGHT AT BEDTIME FOR ALLERGIES  Dispense: 90 tablet; Refill: 0    11. Acquired hypothyroidism  -     levothyroxine (SYNTHROID) 88 MCG tablet; Take 1 tablet (88 mcg total) by mouth before breakfast.  Dispense: 90 tablet; Refill: 1    12. Gastroesophageal reflux disease, unspecified whether esophagitis present  -     esomeprazole (NEXIUM) 20 MG capsule; Take 2 capsules (40 mg total) by mouth before breakfast.  Dispense: 90 capsule; Refill: 1    13. Vitamin D deficiency  -     ergocalciferol (ERGOCALCIFEROL) 50,000 unit Cap; Take 1 capsule (50,000 Units total) by mouth every 7 days.  Dispense: 13 capsule; Refill: 1    Other orders  -     cyclobenzaprine (FLEXERIL) 10 MG tablet; Take 1 tablet (10 mg total) by mouth daily as needed for Muscle spasms.  Dispense: 90 tablet; Refill: 1          No results found for this or any previous visit (from the past 24 hour(s)).    Follow Up:  No follow-ups on file.      Discussed the diagnosis, prognosis, plan of care, chronic nature of and indications for, risks and benefits of treatment for  conditions.  Continue all medications as prescribed unless otherwise noted.   Call if patient develops other symptoms or if not better in 2-3 days and sooner if worse. Emphasized the importance of compliance with all recommendations, including medication use and timely follow up. Instructed to return as noted be or sooner if patient develops any other problems or if there are any other additional questions or concerns.

## 2024-04-15 NOTE — ASSESSMENT & PLAN NOTE
Requip 2 mg 1 p.o. q.d. for restless legs is improving the symptoms and resting better at night with her other medications. The current medical regimen is effective;  continue present plan and medications.

## 2024-04-15 NOTE — ASSESSMENT & PLAN NOTE
Taking flexaril 10 mg qhs, add stretching exercises. TENS, if severe Dr Oliveira has rx Ultram prn severe pain.

## 2024-04-15 NOTE — ASSESSMENT & PLAN NOTE
current recommendation with risk factors is less than 70.  Standard of care recommend  statin with either Crestor 10 mg can take with CoQ10 100 mg. . Recheck cmp, flp, in 6 months.

## 2024-04-15 NOTE — ASSESSMENT & PLAN NOTE
Synthroid 88 mcg q.d. on an empty stomach.  Therapeutic TSH and free T4 continue current medication recheck TSH in six-month notify any changes or problems.

## 2024-04-15 NOTE — ASSESSMENT & PLAN NOTE
Hemoglobin A1c has decreased to 5.2 very well-controlled with current medication including weight loss metformin 500 daily and module row 15 mg weekly.  No side effects with medication.  The current medical regimen is effective;  continue present plan and medications.

## 2024-04-15 NOTE — ASSESSMENT & PLAN NOTE
Taking Haldol 2 mg nightly q.h.s. with Klonopin 0.5 mg b.i.d. Trintellix 20 mg p.o. Q D with trazodone 300 mg q.h.s. she is meeting regularly with the mental health prescriber and feels that the medication is working well to control symptoms.  Negative for any bingeing we will suicide ideation adverse effects of medication.

## 2024-04-24 ENCOUNTER — TELEPHONE (OUTPATIENT)
Dept: FAMILY MEDICINE | Facility: CLINIC | Age: 59
End: 2024-04-24
Payer: COMMERCIAL

## 2024-04-24 DIAGNOSIS — G25.81 RESTLESS LEGS: Primary | ICD-10-CM

## 2024-04-24 RX ORDER — GABAPENTIN 100 MG/1
CAPSULE ORAL
Qty: 90 CAPSULE | Refills: 1 | Status: SHIPPED | OUTPATIENT
Start: 2024-04-24

## 2024-04-24 RX ORDER — GABAPENTIN 100 MG/1
100 CAPSULE ORAL 3 TIMES DAILY
COMMUNITY
End: 2024-04-24 | Stop reason: SDUPTHER

## 2024-04-24 NOTE — TELEPHONE ENCOUNTER
Spoke to Kiana and she ordered Gabapentin 100mg one at 4 pm and 2 at bedtime as needed. Patient notified and script sent in.

## 2024-04-24 NOTE — TELEPHONE ENCOUNTER
Patient called to report that she is having a lot of pain to legs at night with restless legs. She is currently on requip 2mg but not helping. States she was told that she could not take something else because it would affect another medication that she is on, could not remember which med, but it is really bothering her and affecting sleep. What can she do?

## 2024-06-19 DIAGNOSIS — E55.9 VITAMIN D DEFICIENCY: ICD-10-CM

## 2024-06-19 RX ORDER — ERGOCALCIFEROL 1.25 MG/1
CAPSULE ORAL
Qty: 13 CAPSULE | Refills: 1 | Status: SHIPPED | OUTPATIENT
Start: 2024-06-19

## 2024-06-28 DIAGNOSIS — E11.9 TYPE 2 DIABETES MELLITUS WITHOUT COMPLICATION, WITHOUT LONG-TERM CURRENT USE OF INSULIN: ICD-10-CM

## 2024-06-28 DIAGNOSIS — E78.2 MIXED HYPERLIPIDEMIA: ICD-10-CM

## 2024-07-01 RX ORDER — ROSUVASTATIN CALCIUM 10 MG/1
TABLET, COATED ORAL
Qty: 90 TABLET | Refills: 1 | Status: SHIPPED | OUTPATIENT
Start: 2024-07-01

## 2024-07-15 ENCOUNTER — OFFICE VISIT (OUTPATIENT)
Dept: FAMILY MEDICINE | Facility: CLINIC | Age: 59
End: 2024-07-15
Payer: COMMERCIAL

## 2024-07-15 VITALS
SYSTOLIC BLOOD PRESSURE: 130 MMHG | TEMPERATURE: 98 F | WEIGHT: 213.38 LBS | HEART RATE: 85 BPM | DIASTOLIC BLOOD PRESSURE: 70 MMHG | HEIGHT: 62 IN | BODY MASS INDEX: 39.27 KG/M2 | OXYGEN SATURATION: 99 %

## 2024-07-15 DIAGNOSIS — G25.81 RESTLESS LEG SYNDROME: ICD-10-CM

## 2024-07-15 DIAGNOSIS — E11.9 CONTROLLED TYPE 2 DIABETES MELLITUS WITHOUT COMPLICATION, WITHOUT LONG-TERM CURRENT USE OF INSULIN: ICD-10-CM

## 2024-07-15 DIAGNOSIS — M50.30 DDD (DEGENERATIVE DISC DISEASE), CERVICAL: ICD-10-CM

## 2024-07-15 DIAGNOSIS — G25.81 RESTLESS LEGS: ICD-10-CM

## 2024-07-15 DIAGNOSIS — E11.9 TYPE 2 DIABETES MELLITUS WITHOUT COMPLICATION, WITHOUT LONG-TERM CURRENT USE OF INSULIN: Primary | ICD-10-CM

## 2024-07-15 LAB — GLUCOSE SERPL-MCNC: 98 MG/DL (ref 70–110)

## 2024-07-15 PROCEDURE — 82962 GLUCOSE BLOOD TEST: CPT | Mod: ,,, | Performed by: NURSE PRACTITIONER

## 2024-07-15 PROCEDURE — 3078F DIAST BP <80 MM HG: CPT | Mod: CPTII,,, | Performed by: NURSE PRACTITIONER

## 2024-07-15 PROCEDURE — 3044F HG A1C LEVEL LT 7.0%: CPT | Mod: CPTII,,, | Performed by: NURSE PRACTITIONER

## 2024-07-15 PROCEDURE — 1159F MED LIST DOCD IN RCRD: CPT | Mod: CPTII,,, | Performed by: NURSE PRACTITIONER

## 2024-07-15 PROCEDURE — 99214 OFFICE O/P EST MOD 30 MIN: CPT | Mod: ,,, | Performed by: NURSE PRACTITIONER

## 2024-07-15 PROCEDURE — 1160F RVW MEDS BY RX/DR IN RCRD: CPT | Mod: CPTII,,, | Performed by: NURSE PRACTITIONER

## 2024-07-15 PROCEDURE — 2023F DILAT RTA XM W/O RTNOPTHY: CPT | Mod: CPTII,,, | Performed by: NURSE PRACTITIONER

## 2024-07-15 PROCEDURE — 3008F BODY MASS INDEX DOCD: CPT | Mod: CPTII,,, | Performed by: NURSE PRACTITIONER

## 2024-07-15 PROCEDURE — 3075F SYST BP GE 130 - 139MM HG: CPT | Mod: CPTII,,, | Performed by: NURSE PRACTITIONER

## 2024-07-15 RX ORDER — HYDROXYZINE HYDROCHLORIDE 10 MG/1
10 TABLET, FILM COATED ORAL 2 TIMES DAILY PRN
COMMUNITY
Start: 2024-01-31

## 2024-07-15 RX ORDER — ROPINIROLE 2 MG/1
TABLET, FILM COATED ORAL
Qty: 30 TABLET | Refills: 2 | Status: SHIPPED | OUTPATIENT
Start: 2024-07-15

## 2024-07-15 RX ORDER — TIRZEPATIDE 15 MG/.5ML
15 INJECTION, SOLUTION SUBCUTANEOUS
Qty: 4 PEN | Refills: 5 | Status: SHIPPED | OUTPATIENT
Start: 2024-07-15

## 2024-07-15 RX ORDER — HYDROXYZINE HYDROCHLORIDE 50 MG/1
50 TABLET, FILM COATED ORAL 2 TIMES DAILY
COMMUNITY
Start: 2024-06-19

## 2024-07-15 RX ORDER — GABAPENTIN 100 MG/1
CAPSULE ORAL
Qty: 240 CAPSULE | Refills: 1 | Status: SHIPPED | OUTPATIENT
Start: 2024-07-15

## 2024-07-15 NOTE — ASSESSMENT & PLAN NOTE
Requip 2 mg qhs. Gabapentin 200 mg qhs.  The current medical regimen is effective;  continue present plan and medications.

## 2024-07-15 NOTE — ASSESSMENT & PLAN NOTE
Mounjaro 12.5 mg q7d injection (yanni replace with 15 mg if available. Metformin xr 500 daily.   Hemoglobin A1c   Date Value Ref Range Status   04/03/2024 5.2 4.8 - 5.6 % Final     Comment:              Prediabetes: 5.7 - 6.4           Diabetes: >6.4           Glycemic control for adults with diabetes: <7.0     09/05/2023 5.0 4.8 - 5.6 % Final     Comment:              Prediabetes: 5.7 - 6.4           Diabetes: >6.4           Glycemic control for adults with diabetes: <7.0     04/10/2023 5.3 4.8 - 5.6 % Final     Comment:              Prediabetes: 5.7 - 6.4           Diabetes: >6.4           Glycemic control for adults with diabetes: <7.0     02/09/2022 5.7 4.8 - 5.6    04/19/2021 5.1 4.8 - 5.6 % Final   10/06/2020 5.0 4.8 - 5.6 % Final     Comment:     Specimen Received d/t:  10/06/2020 00:00:                     Lab test performed by:   [facility] [place]   [facility] Maria Elena PETERS [id number]   [id number]   Elissa Shelton MD [id number]         Recheck hgb in April or any problems check sooner. Return to mounjaor 15 mg. Weekly.

## 2024-07-15 NOTE — PROGRESS NOTES
Patient ID: Brenda Humphreys  : 1965     Chief Complaint: Follow-up    Allergies: Patient has No Known Allergies.     History of Present Illness:  The patient is a 59 y.o. White female who presents to clinic for evaluation and management with a chief complaint of Follow-up   The patient taking gabapentin 200 mg daily at 8:00 pm. Feeling medication working very well. Less restless leg and more comfortable. Still taking requip and finding working better to sleep more peacefully at night. Taking mounjaro 12.5 and working but 15 mg working better in the past. Still seeing counseling and mental health medication.          Social History:  reports that she has never smoked. She has never been exposed to tobacco smoke. She has never used smokeless tobacco. She reports current alcohol use. She reports that she does not use drugs.    Past Medical History:  has a past medical history of Basal cell carcinoma of neck, face, and arm, Carpal tunnel syndrome, Chronic gastritis, Decreased estrogen level, Deviated nasal septum, Fatigue, GERD (gastroesophageal reflux disease), H/O endocrine disorder, Hiatal hernia, Hormone replacement therapy, Hypertrophy of nasal turbinates, Insomnia, Iron deficiency anemia, unspecified, Memory impairment, Metabolic syndrome X, Mixed hyperlipidemia, Narcolepsy, Obstructive sleep apnea, Phonic tic, Prediabetes, Radicular pain, Restless legs, Stress incontinence (female) (male), Ulcer of nasal septum, Vitamin B12 deficiency, and Vitamin D deficiency.    Care Team: Patient Care Team:  Kiana Bingham DNP as PCP - General (Family Medicine)  [facility], [facility]  [facility], [facility]     Current Medications:  Current Outpatient Medications   Medication Instructions    cholestyramine (QUESTRAN) 4 gram packet 1 packet, Oral, 2 times daily    clonazePAM (KLONOPIN) 0.5 MG tablet Take 1 tablet (0.5 mg total) by mouth 2 (two) times daily as needed for Anxiety for 14 days, THEN 1  tablet (0.5 mg total) daily as needed for Anxiety.    cloNIDine (CATAPRES) 0.1 mg, Oral, Nightly    cyclobenzaprine (FLEXERIL) 10 mg, Oral, Daily PRN    ergocalciferol (ERGOCALCIFEROL) 50,000 unit Cap TAKE ONE(1) CAP BY MOUTH ONCE A WEEK ON THE SAME DAY EACH WEEK FOR VITAMIN D /DO NOT CRUSH OR CHEW    esomeprazole (NEXIUM) 40 mg, Oral, Before breakfast    estradioL (EVAMIST) 1.53 mg/spray (1.7%) transdermal spray 2 sprays, Transdermal, Daily    gabapentin (NEURONTIN) 100 MG capsule Take one tablet at 4 pm and 2 at bedtime if needed for restless legs    haloperidoL (HALDOL) 2 mg, Oral, Nightly    hydrOXYzine (ATARAX) 50 mg, Oral, 2 times daily    hydrOXYzine HCL (ATARAX) 10 mg, Oral, 2 times daily PRN    levothyroxine (SYNTHROID) 88 mcg, Oral, Before breakfast    meloxicam (MOBIC) 15 MG tablet TAKE ONE(1) TAB BY MOUTH ONCE A DAY WITH FULL GLASS OF WATER & FOOD FOR PAIN, INFLAMMATION, &/OR ARTHRITIS    metFORMIN (GLUCOPHAGE-XR) 500 mg, Oral, Daily    modafiniL (PROVIGIL) 200 mg, Oral, Daily    mometasone 0.1% (ELOCON) 0.1 % cream 1 application , Topical (Top), 2 times daily PRN    montelukast (SINGULAIR) 10 mg tablet TAKE ONE(1) TAB BY MOUTH EVERY NIGHT AT BEDTIME FOR ALLERGIES    MOUNJARO 15 mg, Subcutaneous, Every 7 days    rOPINIRole (REQUIP) 2 MG tablet TAKE ONE(1) TAB BY MOUTH EVERY NIGHT AT BEDTIME FOR RESTLESS LEGS    rosuvastatin (CRESTOR) 10 MG tablet TAKE ONE(1) TAB BY MOUTH ONCE A DAY IN THE EVENING FOR CHOLESTEROL    traMADoL (ULTRAM) 50 mg, Oral, 2 times daily PRN    traZODone (DESYREL) 300 mg, Oral, Nightly    triamcinolone acetonide 0.1% (KENALOG) 0.1 % paste SMARTSIG:TO TEETH    TRINTELLIX 20 mg, Oral, Daily       Review of Systems   Constitutional:  Negative for activity change and appetite change.   HENT:  Negative for nasal congestion, trouble swallowing and voice change.    Eyes: Negative.  Negative for visual disturbance.   Respiratory: Negative.  Negative for chest tightness, shortness of breath  "and wheezing.    Cardiovascular:  Negative for chest pain, palpitations and leg swelling.   Endocrine: Negative for cold intolerance, heat intolerance and polyuria.   Genitourinary:  Negative for bladder incontinence, difficulty urinating, flank pain and frequency.   Musculoskeletal:  Positive for arthralgias.   Integumentary:  Negative.   Allergic/Immunologic: Negative for environmental allergies and food allergies.   Neurological:  Negative for vertigo, tremors, seizures, syncope, light-headedness, headaches and coordination difficulties.   Hematological:  Negative for adenopathy. Does not bruise/bleed easily.   Psychiatric/Behavioral:  Negative for agitation, sleep disturbance and suicidal ideas.       Twelve point review of system conducted, negative except as stated in the history of present illness.  See HPI for details.      Visit Vitals  /70 (BP Location: Left arm, Patient Position: Sitting)   Pulse 85   Temp 97.6 °F (36.4 °C) (Temporal)   Ht 5' 2.01" (1.575 m)   Wt 96.8 kg (213 lb 6.4 oz)   LMP  (LMP Unknown)   SpO2 99%   BMI 39.02 kg/m²       Physical Exam  Vitals and nursing note reviewed. Exam conducted with a chaperone present.   Constitutional:       General: She is not in acute distress.     Appearance: She is not ill-appearing.   HENT:      Head: Normocephalic and atraumatic.      Right Ear: Tympanic membrane and external ear normal.      Left Ear: External ear normal. A middle ear effusion is present.      Nose: No rhinorrhea.      Mouth/Throat:      Mouth: Mucous membranes are moist.      Pharynx: Oropharynx is clear. No posterior oropharyngeal erythema.   Eyes:      General: No scleral icterus.     Extraocular Movements: Extraocular movements intact.      Conjunctiva/sclera: Conjunctivae normal.      Pupils: Pupils are equal, round, and reactive to light.   Neck:      Vascular: No carotid bruit.   Cardiovascular:      Rate and Rhythm: Normal rate and regular rhythm.      Pulses: Normal " pulses.           Dorsalis pedis pulses are 2+ on the right side and 2+ on the left side.        Posterior tibial pulses are 2+ on the right side and 2+ on the left side.      Heart sounds: Normal heart sounds. No murmur heard.     No friction rub. No gallop.   Pulmonary:      Effort: Pulmonary effort is normal. No respiratory distress.      Breath sounds: Normal breath sounds. No wheezing, rhonchi or rales.   Chest:   Breasts:     Right: Skin change present.      Left: Normal.          Comments: Three stitches removed with no drainage or irritation tolerated well cleanse with sterile saline.   Abdominal:      General: Abdomen is flat. Bowel sounds are normal. There is no distension.      Palpations: Abdomen is soft. There is no mass.      Tenderness: There is no abdominal tenderness.   Musculoskeletal:      Left shoulder: No deformity or bony tenderness. Decreased range of motion. Normal strength. Normal pulse.        Arms:       Cervical back: Normal range of motion and neck supple.   Feet:      Right foot:      Protective Sensation: 10 sites tested.  10 sites sensed.      Skin integrity: Skin integrity normal.      Toenail Condition: Right toenails are normal.      Left foot:      Protective Sensation: 10 sites tested.  10 sites sensed.      Skin integrity: Skin integrity normal.      Toenail Condition: Left toenails are normal.   Skin:     General: Skin is warm and dry.   Neurological:      General: No focal deficit present.      Mental Status: She is alert and oriented to person, place, and time.   Psychiatric:         Mood and Affect: Mood normal.         Behavior: Behavior normal.          Labs Reviewed:  Chemistry:  Lab Results   Component Value Date     04/01/2024    K 5.0 04/01/2024    BUN 14 04/01/2024    CREATININE 1.04 (H) 04/01/2024    EGFRNORACEVR 62 04/01/2024    GLUCOSE 119 (H) 09/21/2021    CALCIUM 9.6 04/01/2024    ALKPHOS 109 09/21/2021    LABPROT 7.1 09/21/2021    ALBUMIN 4.3 04/01/2024     AST 21 04/01/2024    ALT 19 04/01/2024    GLPRMZOF34PJ 55.1 12/05/2023    TSH 1.040 04/01/2024        Lab Results   Component Value Date    HGBA1C 5.2 04/03/2024        Hematology:  Lab Results   Component Value Date    WBC 5.2 04/03/2024    RBC 4.35 04/03/2024    HGB 12.2 04/03/2024    HCT 36.2 04/03/2024    MCV 83 04/03/2024    MCH 28.0 04/03/2024    MCHC 33.7 04/03/2024    RDW 12.7 04/03/2024     04/03/2024    MONO 7 04/03/2024    MONO 0.3 04/03/2024    BASO 0.1 04/03/2024    EOSINOPHIL 2 04/03/2024    BASOPHIL 1 04/03/2024       Lipid Panel:  Lab Results   Component Value Date    CHOL 198 04/01/2024    HDL 73 04/01/2024    TRIG 77 04/01/2024    TOTALCHOLEST 3.2 02/09/2022        Assessment & Plan:    1. Type 2 diabetes mellitus without complication, without long-term current use of insulin  Overview:  Hold metformin and mounjaro. Prior to surgery.     Assessment & Plan:  Mounjaro 12.5 mg q7d injection (yanni replace with 15 mg if available. Metformin xr 500 daily.   Hemoglobin A1c   Date Value Ref Range Status   04/03/2024 5.2 4.8 - 5.6 % Final     Comment:              Prediabetes: 5.7 - 6.4           Diabetes: >6.4           Glycemic control for adults with diabetes: <7.0     09/05/2023 5.0 4.8 - 5.6 % Final     Comment:              Prediabetes: 5.7 - 6.4           Diabetes: >6.4           Glycemic control for adults with diabetes: <7.0     04/10/2023 5.3 4.8 - 5.6 % Final     Comment:              Prediabetes: 5.7 - 6.4           Diabetes: >6.4           Glycemic control for adults with diabetes: <7.0     02/09/2022 5.7 4.8 - 5.6    04/19/2021 5.1 4.8 - 5.6 % Final   10/06/2020 5.0 4.8 - 5.6 % Final     Comment:     Specimen Received d/t:  10/06/2020 00:00:                     Lab test performed by:   [facility]   [facility] Maria Elena PETERS [id number]   [id number]   Elissa Shelton MD [id number]         Recheck hgb in April or any problems check sooner. Return to mounjaor 15 mg. Weekly.      Orders:  -     POCT Glucose, Hand-Held Device    2. Restless legs  Assessment & Plan:  Requip 2 mg qhs. Gabapentin 200 mg qhs.  The current medical regimen is effective;  continue present plan and medications.      Orders:  -     gabapentin (NEURONTIN) 100 MG capsule; Take one tablet at 4 pm and 2 at bedtime if needed for restless legs  Dispense: 240 capsule; Refill: 1    3. DDD (degenerative disc disease), cervical  Assessment & Plan:  Working with PT to help arm impingement with carpal tunnel syndrome.       4. Controlled type 2 diabetes mellitus without complication, without long-term current use of insulin  -     tirzepatide (MOUNJARO) 15 mg/0.5 mL PnIj; Inject 15 mg into the skin every 7 days.  Dispense: 4 Pen; Refill: 5    5. Restless leg syndrome  -     rOPINIRole (REQUIP) 2 MG tablet; TAKE ONE(1) TAB BY MOUTH EVERY NIGHT AT BEDTIME FOR RESTLESS LEGS  Dispense: 30 tablet; Refill: 2         Follow up in about 3 months (around 10/15/2024). Call sooner if needed.

## 2024-07-30 ENCOUNTER — OFFICE VISIT (OUTPATIENT)
Dept: FAMILY MEDICINE | Facility: CLINIC | Age: 59
End: 2024-07-30
Payer: COMMERCIAL

## 2024-07-30 VITALS
BODY MASS INDEX: 39.38 KG/M2 | TEMPERATURE: 98 F | HEIGHT: 62 IN | DIASTOLIC BLOOD PRESSURE: 68 MMHG | SYSTOLIC BLOOD PRESSURE: 101 MMHG | HEART RATE: 83 BPM | OXYGEN SATURATION: 97 % | RESPIRATION RATE: 20 BRPM | WEIGHT: 214 LBS

## 2024-07-30 DIAGNOSIS — R42 VERTIGO: Primary | ICD-10-CM

## 2024-07-30 DIAGNOSIS — E11.9 TYPE 2 DIABETES MELLITUS WITHOUT COMPLICATION, WITHOUT LONG-TERM CURRENT USE OF INSULIN: ICD-10-CM

## 2024-07-30 DIAGNOSIS — J32.9 CHRONIC RHINOSINUSITIS: ICD-10-CM

## 2024-07-30 LAB — GLUCOSE SERPL-MCNC: 96 MG/DL (ref 70–110)

## 2024-07-30 PROCEDURE — 1160F RVW MEDS BY RX/DR IN RCRD: CPT | Mod: CPTII,,, | Performed by: NURSE PRACTITIONER

## 2024-07-30 PROCEDURE — 82962 GLUCOSE BLOOD TEST: CPT | Mod: ,,, | Performed by: NURSE PRACTITIONER

## 2024-07-30 PROCEDURE — 3008F BODY MASS INDEX DOCD: CPT | Mod: CPTII,,, | Performed by: NURSE PRACTITIONER

## 2024-07-30 PROCEDURE — 1159F MED LIST DOCD IN RCRD: CPT | Mod: CPTII,,, | Performed by: NURSE PRACTITIONER

## 2024-07-30 PROCEDURE — 3078F DIAST BP <80 MM HG: CPT | Mod: CPTII,,, | Performed by: NURSE PRACTITIONER

## 2024-07-30 PROCEDURE — 3044F HG A1C LEVEL LT 7.0%: CPT | Mod: CPTII,,, | Performed by: NURSE PRACTITIONER

## 2024-07-30 PROCEDURE — 99213 OFFICE O/P EST LOW 20 MIN: CPT | Mod: ,,, | Performed by: NURSE PRACTITIONER

## 2024-07-30 PROCEDURE — 2023F DILAT RTA XM W/O RTNOPTHY: CPT | Mod: CPTII,,, | Performed by: NURSE PRACTITIONER

## 2024-07-30 PROCEDURE — 3074F SYST BP LT 130 MM HG: CPT | Mod: CPTII,,, | Performed by: NURSE PRACTITIONER

## 2024-07-30 RX ORDER — MECLIZINE HYDROCHLORIDE 25 MG/1
25 TABLET ORAL 3 TIMES DAILY PRN
Qty: 30 TABLET | Refills: 0 | Status: SHIPPED | OUTPATIENT
Start: 2024-07-30

## 2024-07-30 RX ORDER — LORATADINE AND PSEUDOEPHEDRINE SULFATE 5; 120 MG/1; MG/1
1 TABLET, EXTENDED RELEASE ORAL 2 TIMES DAILY
COMMUNITY
Start: 2024-07-30 | End: 2024-08-09

## 2024-07-30 RX ORDER — AZITHROMYCIN 250 MG/1
TABLET, FILM COATED ORAL
Qty: 6 TABLET | Refills: 0 | Status: SHIPPED | OUTPATIENT
Start: 2024-07-30 | End: 2024-08-04

## 2024-07-30 NOTE — PROGRESS NOTES
"SUBJECTIVE:  Brenda Humphreys is a 59 y.o. female here for Dizziness      HPI  Patient in clinic with complaints of dizziness. Patient reports that it started last Sunday.  Not associated with fever sinus congestion but does have chronic sinus drip and fluid behind ears.  No worsening but started with Claritin D 2 days ago does not really see an improvement.  Not notice any drops in blood pressure blood sugar taking all previous medications sleeping well at night greatest problem is after position changes becoming very dizzy near falls but has not fallen.  Sandee allergies, medications, history, and problem list were updated as appropriate.    Review of Systems   Constitutional:  Positive for fatigue.   HENT:  Positive for postnasal drip. Negative for ear pain, sneezing and sore throat.    Eyes: Negative.    Respiratory: Negative.     Cardiovascular: Negative.    Gastrointestinal: Negative.    Endocrine: Negative.    Genitourinary: Negative.    Musculoskeletal: Negative.    Allergic/Immunologic: Positive for environmental allergies.   Neurological:  Positive for dizziness. Negative for tremors, speech difficulty, weakness and light-headedness.   Hematological: Negative.    Psychiatric/Behavioral: Negative.        A comprehensive review of symptoms was completed and negative except as noted above.    OBJECTIVE:  Vital signs  Vitals:    07/30/24 0804 07/30/24 0839 07/30/24 0841 07/30/24 0842   BP: 118/62 109/72 104/68 101/68   BP Location: Left arm Right arm Right arm Right arm   Patient Position: Sitting Lying Sitting Standing   Pulse: 83      Resp: 20      Temp: 97.8 °F (36.6 °C)      SpO2: 97%      Weight: 97.1 kg (214 lb)      Height: 5' 2" (1.575 m)           Physical Exam  Vitals and nursing note reviewed. Exam conducted with a chaperone present.   Constitutional:       Appearance: Normal appearance.   HENT:      Head: Normocephalic.      Right Ear: A middle ear effusion is present. Tympanic membrane is " injected. Tympanic membrane is not bulging.      Left Ear: A middle ear effusion is present. Tympanic membrane is not injected or bulging.      Nose: Congestion and rhinorrhea present.      Mouth/Throat:      Mouth: Mucous membranes are dry.   Eyes:      Extraocular Movements: Extraocular movements intact.      Conjunctiva/sclera: Conjunctivae normal.   Cardiovascular:      Rate and Rhythm: Normal rate and regular rhythm.      Pulses: Normal pulses.   Pulmonary:      Effort: Pulmonary effort is normal.      Breath sounds: Normal breath sounds.   Abdominal:      General: Bowel sounds are normal.      Palpations: Abdomen is soft.   Musculoskeletal:         General: Normal range of motion.      Cervical back: Normal range of motion.   Skin:     General: Skin is warm and dry.      Capillary Refill: Capillary refill takes less than 2 seconds.   Neurological:      General: No focal deficit present.      Mental Status: She is alert.   Psychiatric:         Mood and Affect: Mood normal.          Office Visit on 07/30/2024   Component Date Value Ref Range Status    POC Glucose 07/30/2024 96  70 - 110 MG/DL Final          ASSESSMENT/PLAN:    1. Vertigo  Assessment & Plan:  Antivert 25 mg tid prn. Claritin D in am. If symptoms don't improve in 2 days, zpack if doesn't improve. Exercises for vertigo. Notify if symptoms do not improve or worsen. Use handrails, avoid loose rugs, improve lighting in and outside of home. Use cane or walker. Sit until stable before position changes.      Orders:  -     meclizine (ANTIVERT) 25 mg tablet; Take 1 tablet (25 mg total) by mouth 3 (three) times daily as needed for Dizziness.  Dispense: 30 tablet; Refill: 0    2. Chronic rhinosinusitis  Assessment & Plan:  Singulair 10 mg daily,     Orders:  -     azithromycin (Z-JULIA) 250 MG tablet; Take 2 tablets by mouth on day 1; Take 1 tablet by mouth on days 2-5  Dispense: 6 tablet; Refill: 0  -     loratadine-pseudoephedrine 5-120 mg (CLARITIN-D 12  HOUR) 5-120 mg per tablet; Take 1 tablet by mouth 2 (two) times daily. for 10 days    3. Type 2 diabetes mellitus without complication, without long-term current use of insulin  Overview:  Hold metformin and mounjaro. Prior to surgery.     Orders:  -     POCT Glucose, Hand-Held Device          Recent Results (from the past 24 hour(s))   POCT Glucose, Hand-Held Device    Collection Time: 07/30/24  8:05 AM   Result Value Ref Range    POC Glucose 96 70 - 110 MG/DL       Follow Up:  Follow up if symptoms worsen or fail to improve.      Discussed the diagnosis, prognosis, plan of care, chronic nature of and indications for, risks and benefits of treatment for conditions.  Continue all medications as prescribed unless otherwise noted.   Call if patient develops other symptoms or if not better in 2-3 days and sooner if worse. Emphasized the importance of compliance with all recommendations, including medication use and timely follow up. Instructed to return as noted be or sooner if patient develops any other problems or if there are any other additional questions or concerns.

## 2024-07-30 NOTE — ASSESSMENT & PLAN NOTE
Antivert 25 mg tid prn. Claritin D in am. If symptoms don't improve in 2 days, zpack if doesn't improve. Exercises for vertigo. Notify if symptoms do not improve or worsen. Use handrails, avoid loose rugs, improve lighting in and outside of home. Use cane or walker. Sit until stable before position changes.

## 2024-08-05 ENCOUNTER — OFFICE VISIT (OUTPATIENT)
Dept: OBSTETRICS AND GYNECOLOGY | Facility: CLINIC | Age: 59
End: 2024-08-05
Payer: COMMERCIAL

## 2024-08-05 VITALS
SYSTOLIC BLOOD PRESSURE: 110 MMHG | BODY MASS INDEX: 39.78 KG/M2 | HEIGHT: 62 IN | WEIGHT: 216.19 LBS | TEMPERATURE: 97 F | DIASTOLIC BLOOD PRESSURE: 72 MMHG

## 2024-08-05 DIAGNOSIS — J45.909 ASTHMA, UNSPECIFIED ASTHMA SEVERITY, UNSPECIFIED WHETHER COMPLICATED, UNSPECIFIED WHETHER PERSISTENT: ICD-10-CM

## 2024-08-05 DIAGNOSIS — Z01.419 ROUTINE GYNECOLOGICAL EXAMINATION: Primary | ICD-10-CM

## 2024-08-05 DIAGNOSIS — Z90.710 S/P LAPAROSCOPIC ASSISTED VAGINAL HYSTERECTOMY (LAVH): ICD-10-CM

## 2024-08-05 DIAGNOSIS — Z12.31 SCREENING MAMMOGRAM FOR BREAST CANCER: ICD-10-CM

## 2024-08-05 DIAGNOSIS — Z79.890 HORMONE REPLACEMENT THERAPY (HRT): ICD-10-CM

## 2024-08-05 DIAGNOSIS — Z90.722 S/P BSO (BILATERAL SALPINGO-OOPHORECTOMY): ICD-10-CM

## 2024-08-05 PROCEDURE — 1160F RVW MEDS BY RX/DR IN RCRD: CPT | Mod: CPTII,,, | Performed by: NURSE PRACTITIONER

## 2024-08-05 PROCEDURE — 99396 PREV VISIT EST AGE 40-64: CPT | Mod: ,,, | Performed by: NURSE PRACTITIONER

## 2024-08-05 PROCEDURE — 3078F DIAST BP <80 MM HG: CPT | Mod: CPTII,,, | Performed by: NURSE PRACTITIONER

## 2024-08-05 PROCEDURE — 3008F BODY MASS INDEX DOCD: CPT | Mod: CPTII,,, | Performed by: NURSE PRACTITIONER

## 2024-08-05 PROCEDURE — 3044F HG A1C LEVEL LT 7.0%: CPT | Mod: CPTII,,, | Performed by: NURSE PRACTITIONER

## 2024-08-05 PROCEDURE — 1159F MED LIST DOCD IN RCRD: CPT | Mod: CPTII,,, | Performed by: NURSE PRACTITIONER

## 2024-08-05 PROCEDURE — 3074F SYST BP LT 130 MM HG: CPT | Mod: CPTII,,, | Performed by: NURSE PRACTITIONER

## 2024-08-05 RX ORDER — ESTRADIOL 1 MG/1
1 TABLET ORAL DAILY
Qty: 30 TABLET | Refills: 11 | Status: SHIPPED | OUTPATIENT
Start: 2024-08-05 | End: 2025-08-05

## 2024-08-05 RX ORDER — MONTELUKAST SODIUM 10 MG/1
TABLET ORAL
Qty: 90 TABLET | Refills: 0 | Status: SHIPPED | OUTPATIENT
Start: 2024-08-05

## 2024-08-30 DIAGNOSIS — R52 PAIN: ICD-10-CM

## 2024-08-30 DIAGNOSIS — E03.9 ACQUIRED HYPOTHYROIDISM: ICD-10-CM

## 2024-09-03 RX ORDER — MELOXICAM 15 MG/1
TABLET ORAL
Qty: 30 TABLET | Refills: 3 | Status: SHIPPED | OUTPATIENT
Start: 2024-09-03

## 2024-09-03 RX ORDER — LEVOTHYROXINE SODIUM 88 UG/1
TABLET ORAL
Qty: 90 TABLET | Refills: 1 | Status: SHIPPED | OUTPATIENT
Start: 2024-09-03

## 2024-10-16 ENCOUNTER — OFFICE VISIT (OUTPATIENT)
Dept: FAMILY MEDICINE | Facility: CLINIC | Age: 59
End: 2024-10-16
Payer: COMMERCIAL

## 2024-10-16 VITALS
BODY MASS INDEX: 40.48 KG/M2 | HEIGHT: 62 IN | HEART RATE: 77 BPM | OXYGEN SATURATION: 96 % | DIASTOLIC BLOOD PRESSURE: 69 MMHG | SYSTOLIC BLOOD PRESSURE: 128 MMHG | TEMPERATURE: 97 F | WEIGHT: 220 LBS | RESPIRATION RATE: 20 BRPM

## 2024-10-16 DIAGNOSIS — E11.9 TYPE 2 DIABETES MELLITUS WITHOUT COMPLICATION, WITHOUT LONG-TERM CURRENT USE OF INSULIN: Primary | ICD-10-CM

## 2024-10-16 DIAGNOSIS — R94.4 DECREASED GLOMERULAR FILTRATION RATE (GFR): ICD-10-CM

## 2024-10-16 DIAGNOSIS — E03.4 HYPOTHYROIDISM DUE TO ACQUIRED ATROPHY OF THYROID: ICD-10-CM

## 2024-10-16 LAB — GLUCOSE SERPL-MCNC: 82 MG/DL (ref 70–110)

## 2024-10-16 PROCEDURE — 3044F HG A1C LEVEL LT 7.0%: CPT | Mod: CPTII,,, | Performed by: NURSE PRACTITIONER

## 2024-10-16 PROCEDURE — 3074F SYST BP LT 130 MM HG: CPT | Mod: CPTII,,, | Performed by: NURSE PRACTITIONER

## 2024-10-16 PROCEDURE — 3008F BODY MASS INDEX DOCD: CPT | Mod: CPTII,,, | Performed by: NURSE PRACTITIONER

## 2024-10-16 PROCEDURE — 82962 GLUCOSE BLOOD TEST: CPT | Mod: ,,, | Performed by: NURSE PRACTITIONER

## 2024-10-16 PROCEDURE — 3061F NEG MICROALBUMINURIA REV: CPT | Mod: CPTII,,, | Performed by: NURSE PRACTITIONER

## 2024-10-16 PROCEDURE — 3078F DIAST BP <80 MM HG: CPT | Mod: CPTII,,, | Performed by: NURSE PRACTITIONER

## 2024-10-16 PROCEDURE — 99213 OFFICE O/P EST LOW 20 MIN: CPT | Mod: ,,, | Performed by: NURSE PRACTITIONER

## 2024-10-16 PROCEDURE — 3066F NEPHROPATHY DOC TX: CPT | Mod: CPTII,,, | Performed by: NURSE PRACTITIONER

## 2024-10-16 PROCEDURE — 1159F MED LIST DOCD IN RCRD: CPT | Mod: CPTII,,, | Performed by: NURSE PRACTITIONER

## 2024-10-16 PROCEDURE — 1160F RVW MEDS BY RX/DR IN RCRD: CPT | Mod: CPTII,,, | Performed by: NURSE PRACTITIONER

## 2024-10-16 PROCEDURE — 2023F DILAT RTA XM W/O RTNOPTHY: CPT | Mod: CPTII,,, | Performed by: NURSE PRACTITIONER

## 2024-10-16 NOTE — ASSESSMENT & PLAN NOTE
Increase fluids, especially water. Hold metformin until kidney function increases. Recheck renal panel in e month

## 2024-10-16 NOTE — ASSESSMENT & PLAN NOTE
Started at 194# for 214# in August. Eating more snacks with less activity. Increase protein and lean vegetables, begin activity.

## 2024-10-16 NOTE — ASSESSMENT & PLAN NOTE
Mounjaro 15 mg every 7 days, low fat low cholesterol. The current medical regimen is effective;  continue present plan and medications.

## 2024-10-16 NOTE — PROGRESS NOTES
Patient ID: Brenda Humphreys  : 1965     Chief Complaint: diabetes  Allergies: Patient has No Known Allergies.     History of Present Illness:  The patient is a 59 y.o. White female who presents to clinic for evaluation and management with a chief complaint of diabetes   HPI   Patient in clinic with a follow-up on labs and diabetes. Currently on mounjaro 15 mg. Patient states that it is no longer working. States that she has been gaining weight. Monitoring sugar at home. Has been normal. Patient also states that she is wanting to wait until she gets back to take flu vaccine. States that she will be leaving to go to [place].     Past Medical History:  has a past medical history of Basal cell carcinoma of neck, face, and arm, Carpal tunnel syndrome, Chronic gastritis, Decreased estrogen level, Deviated nasal septum, Fatigue, GERD (gastroesophageal reflux disease), H/O endocrine disorder, Hiatal hernia, Hormone replacement therapy, Hypertrophy of nasal turbinates, Insomnia, Iron deficiency anemia, unspecified, Memory impairment, Metabolic syndrome X, Mixed hyperlipidemia, Narcolepsy, Obstructive sleep apnea, Phonic tic, Prediabetes, Radicular pain, Restless legs, Stress incontinence (female) (male), Ulcer of nasal septum, Vitamin B12 deficiency, and Vitamin D deficiency.    Social History:  reports that she has never smoked. She has never been exposed to tobacco smoke. She has never used smokeless tobacco. She reports current alcohol use. She reports that she does not use drugs.    Care Team: Patient Care Team:  Kiana Bingham DNP as PCP - General (Family Medicine)  [facility], [facility]  [facility], [facility]     Current Medications:  Current Outpatient Medications   Medication Instructions    cholestyramine (QUESTRAN) 4 gram packet 1 packet, 2 times daily    clonazePAM (KLONOPIN) 0.5 MG tablet Take 1 tablet (0.5 mg total) by mouth 2 (two) times daily as needed for Anxiety for 14 days,  THEN 1 tablet (0.5 mg total) daily as needed for Anxiety.    cloNIDine (CATAPRES) 0.1 mg, Oral, Nightly    cyclobenzaprine (FLEXERIL) 10 mg, Oral, Daily PRN    ergocalciferol (ERGOCALCIFEROL) 50,000 unit Cap TAKE ONE(1) CAP BY MOUTH ONCE A WEEK ON THE SAME DAY EACH WEEK FOR VITAMIN D /DO NOT CRUSH OR CHEW    esomeprazole (NEXIUM) 40 mg, Oral, Before breakfast    estradioL (ESTRACE) 1 mg, Oral, Daily    gabapentin (NEURONTIN) 100 MG capsule Take one tablet at 4 pm and 2 at bedtime if needed for restless legs    haloperidoL (HALDOL) 1 MG tablet 3 tablets, Oral, Nightly    hydrOXYzine (ATARAX) 50 mg, 2 times daily    levothyroxine (SYNTHROID) 88 MCG tablet TAKE ONE(1) TAB EVERY DAY BEFORE BREAKFAST WITH A FULL GLASS OF WATER & ON AN EMPTY STOMACH FOR THYROID    meclizine (ANTIVERT) 25 mg, Oral, 3 times daily PRN    meloxicam (MOBIC) 15 MG tablet TAKE ONE(1) TAB BY MOUTH ONCE A DAY WITH FULL GLASS OF WATER & FOOD FOR PAIN, INFLAMMATION, &/OR ARTHRITIS    modafiniL (PROVIGIL) 200 mg, Oral, Daily    mometasone 0.1% (ELOCON) 0.1 % cream 1 application , 2 times daily PRN    montelukast (SINGULAIR) 10 mg tablet TAKE ONE(1) TAB BY MOUTH EVERY NIGHT AT BEDTIME FOR ALLERGIES    MOUNJARO 15 mg, Subcutaneous, Every 7 days    rOPINIRole (REQUIP) 2 MG tablet TAKE ONE(1) TAB BY MOUTH EVERY NIGHT AT BEDTIME FOR RESTLESS LEGS    rosuvastatin (CRESTOR) 10 MG tablet TAKE ONE(1) TAB BY MOUTH ONCE A DAY IN THE EVENING FOR CHOLESTEROL    traMADoL (ULTRAM) 50 mg, 2 times daily PRN    traZODone (DESYREL) 300 mg, Oral, Nightly    triamcinolone acetonide 0.1% (KENALOG) 0.1 % paste SMARTSIG:TO TEETH    TRINTELLIX 20 mg, Oral, Daily       Review of Systems   Constitutional:  Positive for activity change, appetite change and fatigue.   HENT: Negative.     Respiratory: Negative.     Cardiovascular: Negative.    Gastrointestinal: Negative.    Endocrine: Positive for cold intolerance.   Genitourinary: Negative.    Musculoskeletal:  Positive for neck  "pain.   Psychiatric/Behavioral:  Positive for dysphoric mood.         Visit Vitals  /69 (BP Location: Left arm, Patient Position: Sitting)   Pulse 77   Temp 97.2 °F (36.2 °C)   Resp 20   Ht 5' 2" (1.575 m)   Wt 99.8 kg (220 lb)   LMP  (LMP Unknown)   SpO2 96%   BMI 40.24 kg/m²       Physical Exam  Constitutional:       Appearance: Normal appearance.   HENT:      Head: Normocephalic.      Nose: Nose normal.      Mouth/Throat:      Mouth: Mucous membranes are dry.   Eyes:      Extraocular Movements: Extraocular movements intact.      Conjunctiva/sclera: Conjunctivae normal.   Cardiovascular:      Rate and Rhythm: Normal rate and regular rhythm.      Pulses: Normal pulses.   Pulmonary:      Effort: Pulmonary effort is normal.      Breath sounds: Normal breath sounds.   Abdominal:      General: Bowel sounds are normal.      Palpations: Abdomen is soft.   Musculoskeletal:         General: Normal range of motion.      Cervical back: Normal range of motion.   Skin:     General: Skin is warm and dry.      Capillary Refill: Capillary refill takes less than 2 seconds.   Neurological:      General: No focal deficit present.      Mental Status: She is alert and oriented to person, place, and time.   Psychiatric:         Mood and Affect: Mood normal.          Labs Reviewed:  Chemistry:  Lab Results   Component Value Date     10/14/2024    K 4.7 10/14/2024    BUN 12 10/14/2024    CREATININE 1.31 (H) 10/14/2024    EGFRNORACEVR 47 (L) 10/14/2024    GLUCOSE 119 (H) 09/21/2021    CALCIUM 9.5 10/14/2024    ALKPHOS 109 09/21/2021    LABPROT 7.1 09/21/2021    ALBUMIN 4.5 10/14/2024    AST 18 10/14/2024    ALT 15 10/14/2024    [id number] 99.8 10/14/2024    TSH 0.866 10/14/2024        Lab Results   Component Value Date    HGBA1C 5.1 10/14/2024        Hematology:  Lab Results   Component Value Date    WBC 4.7 10/14/2024    RBC 4.24 10/14/2024    HGB 11.7 10/14/2024    HCT 35.8 10/14/2024    MCV 84 10/14/2024    MCH 27.6 " 10/14/2024    MCHC 32.7 10/14/2024    RDW 12.8 10/14/2024     10/14/2024    MONO 7 10/14/2024    MONO 0.3 10/14/2024    BASO 0.1 10/14/2024    EOSINOPHIL 3 10/14/2024    BASOPHIL 1 10/14/2024       Lipid Panel:  Lab Results   Component Value Date    CHOL 127 10/14/2024    HDL 67 10/14/2024    LDLCALC 45 10/14/2024    TRIG 74 10/14/2024    TOTALCHOLEST 3.2 02/09/2022        Assessment & Plan:  1. Type 2 diabetes mellitus without complication, without long-term current use of insulin  Overview:  Hold metformin and mounjaro. Prior to surgery.     Assessment & Plan:  Mounjaro 15 mg every 7 days, low fat low cholesterol. The current medical regimen is effective;  continue present plan and medications.      Orders:  -     POCT Glucose, Hand-Held Device    2. Decreased glomerular filtration rate (GFR)  Assessment & Plan:  Increase fluids, especially water. Hold metformin until kidney function increases. Recheck renal panel in e month      3. BMI 40.0-44.9, adult  Assessment & Plan:  Started at 194# for 214# in August. Eating more snacks with less activity. Increase protein and lean vegetables, begin activity.       4. Hypothyroidism due to acquired atrophy of thyroid  Assessment & Plan:  synthroid 88 mcg daily on empty stomach. Recheck tsh, in 6 months. But hold biotin 4 days before next lab draw.            Future Appointments   Date Time Provider Department Center   8/6/2025  1:00 PM Erika Fraga NP [facility]       No follow-ups on file. Call sooner if needed.    Kiana Bingham, DOUGLAS    Lab Frequency Next Occurrence   Mammo Digital Screening Bilat w/ Robin Once 11/01/2024

## 2024-11-11 ENCOUNTER — CLINICAL SUPPORT (OUTPATIENT)
Dept: RESPIRATORY THERAPY | Facility: HOSPITAL | Age: 59
End: 2024-11-11
Attending: NURSE PRACTITIONER
Payer: COMMERCIAL

## 2024-11-11 ENCOUNTER — LAB VISIT (OUTPATIENT)
Dept: LAB | Facility: HOSPITAL | Age: 59
End: 2024-11-11
Attending: NURSE PRACTITIONER
Payer: COMMERCIAL

## 2024-11-11 DIAGNOSIS — Z01.818 PRE-OP EXAM: ICD-10-CM

## 2024-11-11 DIAGNOSIS — Z01.818 PRE-OP EXAM: Primary | ICD-10-CM

## 2024-11-11 LAB
ANION GAP SERPL CALC-SCNC: 6 MEQ/L (ref 2–13)
BASOPHILS # BLD AUTO: 0.04 X10(3)/MCL (ref 0.01–0.08)
BASOPHILS NFR BLD AUTO: 0.7 % (ref 0.1–1.2)
BUN SERPL-MCNC: 12 MG/DL (ref 7–20)
CALCIUM SERPL-MCNC: 10.2 MG/DL (ref 8.4–10.2)
CHLORIDE SERPL-SCNC: 104 MMOL/L (ref 98–110)
CO2 SERPL-SCNC: 25 MMOL/L (ref 21–32)
CREAT SERPL-MCNC: 1.01 MG/DL (ref 0.66–1.25)
CREAT/UREA NIT SERPL: 12 (ref 12–20)
EOSINOPHIL # BLD AUTO: 0.15 X10(3)/MCL (ref 0.04–0.36)
EOSINOPHIL NFR BLD AUTO: 2.8 % (ref 0.7–7)
ERYTHROCYTE [DISTWIDTH] IN BLOOD BY AUTOMATED COUNT: 12.6 % (ref 11–14.5)
GFR SERPLBLD CREATININE-BSD FMLA CKD-EPI: 64 ML/MIN/1.73/M2
GLUCOSE SERPL-MCNC: 104 MG/DL (ref 70–115)
HCT VFR BLD AUTO: 42.8 % (ref 36–48)
HGB BLD-MCNC: 13.8 G/DL (ref 11.8–16)
IMM GRANULOCYTES # BLD AUTO: 0 X10(3)/MCL (ref 0–0.03)
IMM GRANULOCYTES NFR BLD AUTO: 0 % (ref 0–0.5)
LYMPHOCYTES # BLD AUTO: 1.6 X10(3)/MCL (ref 1.16–3.74)
LYMPHOCYTES NFR BLD AUTO: 30 % (ref 20–55)
MCH RBC QN AUTO: 27.4 PG (ref 27–34)
MCHC RBC AUTO-ENTMCNC: 32.2 G/DL (ref 31–37)
MCV RBC AUTO: 84.9 FL (ref 79–99)
MONOCYTES # BLD AUTO: 0.36 X10(3)/MCL (ref 0.24–0.36)
MONOCYTES NFR BLD AUTO: 6.7 % (ref 4.7–12.5)
NEUTROPHILS # BLD AUTO: 3.19 X10(3)/MCL (ref 1.56–6.13)
NEUTROPHILS NFR BLD AUTO: 59.8 % (ref 37–73)
OHS QRS DURATION: 82 MS
OHS QTC CALCULATION: 456 MS
PLATELET # BLD AUTO: 214 X10(3)/MCL (ref 140–371)
PMV BLD AUTO: 9.4 FL (ref 9.4–12.4)
POTASSIUM SERPL-SCNC: 4.6 MMOL/L (ref 3.5–5.1)
RBC # BLD AUTO: 5.04 X10(6)/MCL (ref 4–5.1)
SODIUM SERPL-SCNC: 135 MMOL/L (ref 136–145)
WBC # BLD AUTO: 5.34 X10(3)/MCL (ref 4–11.5)

## 2024-11-11 PROCEDURE — 80048 BASIC METABOLIC PNL TOTAL CA: CPT

## 2024-11-11 PROCEDURE — 85025 COMPLETE CBC W/AUTO DIFF WBC: CPT

## 2024-11-11 PROCEDURE — 36415 COLL VENOUS BLD VENIPUNCTURE: CPT

## 2024-11-11 PROCEDURE — 93005 ELECTROCARDIOGRAM TRACING: CPT

## 2024-11-11 PROCEDURE — 93010 ELECTROCARDIOGRAM REPORT: CPT | Mod: ,,, | Performed by: INTERNAL MEDICINE

## 2024-11-12 ENCOUNTER — OFFICE VISIT (OUTPATIENT)
Dept: FAMILY MEDICINE | Facility: CLINIC | Age: 59
End: 2024-11-12
Payer: COMMERCIAL

## 2024-11-12 VITALS
BODY MASS INDEX: 39.56 KG/M2 | TEMPERATURE: 97 F | HEART RATE: 78 BPM | HEIGHT: 62 IN | WEIGHT: 215 LBS | SYSTOLIC BLOOD PRESSURE: 119 MMHG | RESPIRATION RATE: 20 BRPM | OXYGEN SATURATION: 100 % | DIASTOLIC BLOOD PRESSURE: 74 MMHG

## 2024-11-12 DIAGNOSIS — E55.9 VITAMIN D DEFICIENCY: ICD-10-CM

## 2024-11-12 DIAGNOSIS — Z23 IMMUNIZATION DUE: ICD-10-CM

## 2024-11-12 DIAGNOSIS — E11.9 TYPE 2 DIABETES MELLITUS WITHOUT COMPLICATION, WITHOUT LONG-TERM CURRENT USE OF INSULIN: ICD-10-CM

## 2024-11-12 DIAGNOSIS — Z01.818 PREOPERATIVE CLEARANCE: Primary | ICD-10-CM

## 2024-11-12 DIAGNOSIS — E66.812 CLASS 2 OBESITY DUE TO EXCESS CALORIES WITHOUT SERIOUS COMORBIDITY WITH BODY MASS INDEX (BMI) OF 39.0 TO 39.9 IN ADULT: ICD-10-CM

## 2024-11-12 DIAGNOSIS — M50.30 DDD (DEGENERATIVE DISC DISEASE), CERVICAL: ICD-10-CM

## 2024-11-12 DIAGNOSIS — E66.09 CLASS 2 OBESITY DUE TO EXCESS CALORIES WITHOUT SERIOUS COMORBIDITY WITH BODY MASS INDEX (BMI) OF 39.0 TO 39.9 IN ADULT: ICD-10-CM

## 2024-11-12 DIAGNOSIS — G47.33 OBSTRUCTIVE SLEEP APNEA: ICD-10-CM

## 2024-11-12 PROBLEM — R94.4 DECREASED GLOMERULAR FILTRATION RATE (GFR): Status: RESOLVED | Noted: 2024-10-16 | Resolved: 2024-11-12

## 2024-11-12 LAB — GLUCOSE SERPL-MCNC: 84 MG/DL (ref 70–110)

## 2024-11-12 PROCEDURE — 3066F NEPHROPATHY DOC TX: CPT | Mod: CPTII,,, | Performed by: NURSE PRACTITIONER

## 2024-11-12 PROCEDURE — 99214 OFFICE O/P EST MOD 30 MIN: CPT | Mod: 25,,, | Performed by: NURSE PRACTITIONER

## 2024-11-12 PROCEDURE — 2023F DILAT RTA XM W/O RTNOPTHY: CPT | Mod: CPTII,,, | Performed by: NURSE PRACTITIONER

## 2024-11-12 PROCEDURE — 3078F DIAST BP <80 MM HG: CPT | Mod: CPTII,,, | Performed by: NURSE PRACTITIONER

## 2024-11-12 PROCEDURE — 90656 IIV3 VACC NO PRSV 0.5 ML IM: CPT | Mod: ,,, | Performed by: NURSE PRACTITIONER

## 2024-11-12 PROCEDURE — 3008F BODY MASS INDEX DOCD: CPT | Mod: CPTII,,, | Performed by: NURSE PRACTITIONER

## 2024-11-12 PROCEDURE — 3044F HG A1C LEVEL LT 7.0%: CPT | Mod: CPTII,,, | Performed by: NURSE PRACTITIONER

## 2024-11-12 PROCEDURE — 82962 GLUCOSE BLOOD TEST: CPT | Mod: ,,, | Performed by: NURSE PRACTITIONER

## 2024-11-12 PROCEDURE — 3074F SYST BP LT 130 MM HG: CPT | Mod: CPTII,,, | Performed by: NURSE PRACTITIONER

## 2024-11-12 PROCEDURE — 1160F RVW MEDS BY RX/DR IN RCRD: CPT | Mod: CPTII,,, | Performed by: NURSE PRACTITIONER

## 2024-11-12 PROCEDURE — 1159F MED LIST DOCD IN RCRD: CPT | Mod: CPTII,,, | Performed by: NURSE PRACTITIONER

## 2024-11-12 PROCEDURE — 90471 IMMUNIZATION ADMIN: CPT | Mod: ,,, | Performed by: NURSE PRACTITIONER

## 2024-11-12 PROCEDURE — 3061F NEG MICROALBUMINURIA REV: CPT | Mod: CPTII,,, | Performed by: NURSE PRACTITIONER

## 2024-11-12 RX ORDER — MODAFINIL 100 MG/1
100 TABLET ORAL DAILY
COMMUNITY
Start: 2024-10-31

## 2024-11-12 RX ORDER — LEVOCETIRIZINE DIHYDROCHLORIDE 5 MG/1
5 TABLET, FILM COATED ORAL NIGHTLY
COMMUNITY

## 2024-11-12 NOTE — ASSESSMENT & PLAN NOTE
Cleared for left shoulder scope/RCR with general anesthesia. No past problems with anesthesia. Blood pressure and chronic conditions stable. Hold mounjaro one week before. Hold mobic and all NSAIDS week before surgery.

## 2024-11-12 NOTE — ASSESSMENT & PLAN NOTE
Planning to see Cervical surgeon tomorrow. Not certain if ready to start with neck or shoulder surgery. Returning from massage and feeling no discomfort at this time.

## 2024-11-12 NOTE — PROGRESS NOTES
Patient ID: Brenda Humphreys  : 1965     Chief Complaint: Diabetes and Pre-op Exam    Allergies: Patient has No Known Allergies.     History of Present Illness:  The patient is a 59 y.o. White female who presents to clinic for evaluation and management with a chief complaint of Diabetes and Pre-op Exam   HPI   Patient in clinic with 1 month follow-up. Continues to lose weight while on mounjaro. Patient states that on her scale it is showing 10 lbs and on our scale it is showing 5 lbs. Patient is doing well. Feeling well. No problems. Patient is also needing pre-op exam for left should surgery. States that Dr. Miranda will be [facility] [place]. Waiting on date until after clearance. Feeling well with medication changes.     Past Medical History:  has a past medical history of Basal cell carcinoma of neck, face, and arm, Carpal tunnel syndrome, Chronic gastritis, Decreased estrogen level, Deviated nasal septum, Fatigue, GERD (gastroesophageal reflux disease), H/O endocrine disorder, Hiatal hernia, Hormone replacement therapy, Hypertrophy of nasal turbinates, Insomnia, Iron deficiency anemia, unspecified, Memory impairment, Metabolic syndrome X, Mixed hyperlipidemia, Narcolepsy, Obstructive sleep apnea, Phonic tic, Prediabetes, Radicular pain, Restless legs, Stress incontinence (female) (male), Ulcer of nasal septum, Vitamin B12 deficiency, and Vitamin D deficiency.    Social History:  reports that she has never smoked. She has never been exposed to tobacco smoke. She has never used smokeless tobacco. She reports current alcohol use. She reports that she does not use drugs.    Care Team: Patient Care Team:  Kiana Bingham DNP as PCP - General (Family Medicine)  [facility]  [facility], [facility]     Current Medications:  Current Outpatient Medications   Medication Instructions    cholestyramine (QUESTRAN) 4 gram packet 1 packet, 2 times  daily    clonazePAM (KLONOPIN) 0.5 MG tablet Take 1 tablet (0.5 mg total) by mouth 2 (two) times daily as needed for Anxiety for 14 days, THEN 1 tablet (0.5 mg total) daily as needed for Anxiety.    cloNIDine (CATAPRES) 0.1 mg, Oral, Nightly    cyclobenzaprine (FLEXERIL) 10 mg, Oral, Daily PRN    esomeprazole (NEXIUM) 40 mg, Oral, Before breakfast    estradioL (ESTRACE) 1 mg, Oral, Daily    gabapentin (NEURONTIN) 100 MG capsule Take one tablet at 4 pm and 2 at bedtime if needed for restless legs    haloperidoL (HALDOL) 1 MG tablet 3 tablets, Nightly    hydrOXYzine (ATARAX) 50 mg, Nightly    levocetirizine (XYZAL) 5 mg, Nightly    levothyroxine (SYNTHROID) 88 MCG tablet TAKE ONE(1) TAB EVERY DAY BEFORE BREAKFAST WITH A FULL GLASS OF WATER & ON AN EMPTY STOMACH FOR THYROID    meclizine (ANTIVERT) 25 mg, Oral, 3 times daily PRN    modafiniL (PROVIGIL) 100 mg, Daily    mometasone 0.1% (ELOCON) 0.1 % cream 1 application , 2 times daily PRN    montelukast (SINGULAIR) 10 mg tablet TAKE ONE(1) TAB BY MOUTH EVERY NIGHT AT BEDTIME FOR ALLERGIES    MOUNJARO 15 mg, Subcutaneous, Every 7 days    rOPINIRole (REQUIP) 2 MG tablet TAKE ONE(1) TAB BY MOUTH EVERY NIGHT AT BEDTIME FOR RESTLESS LEGS    rosuvastatin (CRESTOR) 10 MG tablet TAKE ONE(1) TAB BY MOUTH ONCE A DAY IN THE EVENING FOR CHOLESTEROL    traMADoL (ULTRAM) 50 mg, 2 times daily PRN    traZODone (DESYREL) 300 mg, Oral, Nightly    triamcinolone acetonide 0.1% (KENALOG) 0.1 % paste SMARTSIG:TO TEETH    TRINTELLIX 20 mg, Oral, Daily       Review of Systems   Constitutional: Negative.    HENT: Negative.     Respiratory: Negative.     Gastrointestinal: Negative.    Endocrine: Positive for cold intolerance.   Genitourinary:  Negative for hot flashes.   Musculoskeletal:  Positive for arthralgias, joint swelling and neck pain.   Integumentary:  Negative.   Allergic/Immunologic: Positive for environmental allergies.   Neurological: Negative.    Hematological: Negative.   "  Psychiatric/Behavioral:  Positive for sleep disturbance. Negative for decreased concentration and depressed mood. The patient is not nervous/anxious.         Visit Vitals  /74 (BP Location: Left arm, Patient Position: Sitting)   Pulse 78   Temp 96.9 °F (36.1 °C)   Resp 20   Ht 5' 2" (1.575 m)   Wt 97.5 kg (215 lb)   LMP  (LMP Unknown)   SpO2 100%   BMI 39.32 kg/m²       Physical Exam  Vitals and nursing note reviewed.   Constitutional:       General: She is not in acute distress.     Appearance: She is not ill-appearing.   HENT:      Head: Normocephalic and atraumatic.      Nose: Rhinorrhea present.      Mouth/Throat:      Mouth: Mucous membranes are moist.      Pharynx: Oropharynx is clear.   Eyes:      General: No scleral icterus.     Extraocular Movements: Extraocular movements intact.      Conjunctiva/sclera: Conjunctivae normal.      Pupils: Pupils are equal, round, and reactive to light.   Neck:      Vascular: No carotid bruit.   Cardiovascular:      Rate and Rhythm: Normal rate and regular rhythm.      Pulses: Normal pulses.      Heart sounds: Normal heart sounds. No murmur heard.     No friction rub. No gallop.   Pulmonary:      Effort: Pulmonary effort is normal. No respiratory distress.      Breath sounds: Normal breath sounds. No wheezing, rhonchi or rales.   Abdominal:      General: Abdomen is flat. Bowel sounds are normal. There is no distension.      Palpations: Abdomen is soft. There is no mass.      Tenderness: There is no abdominal tenderness.   Musculoskeletal:      Right shoulder: Decreased range of motion.      Cervical back: Neck supple. No bony tenderness. No pain with movement. Decreased range of motion.   Skin:     General: Skin is warm and dry.   Neurological:      General: No focal deficit present.      Mental Status: She is alert and oriented to person, place, and time.   Psychiatric:         Mood and Affect: Mood normal.         Behavior: Behavior normal.          Labs " Reviewed:  Chemistry:  Lab Results   Component Value Date     (L) 11/11/2024    K 4.6 11/11/2024    BUN 12 11/11/2024    CREATININE 1.01 11/11/2024    EGFRNORACEVR 64 11/11/2024    GLUCOSE 104 11/11/2024    CALCIUM 10.2 11/11/2024    ALKPHOS 109 09/21/2021    LABPROT 7.1 09/21/2021    ALBUMIN 4.5 10/14/2024    AST 18 10/14/2024    ALT 15 10/14/2024    [id number] 99.8 10/14/2024    TSH 0.866 10/14/2024        Lab Results   Component Value Date    HGBA1C 5.1 10/14/2024        Hematology:  Lab Results   Component Value Date    WBC 5.34 11/11/2024    RBC 5.04 11/11/2024    HGB 13.8 11/11/2024    HCT 42.8 11/11/2024    MCV 84.9 11/11/2024    MCH 27.4 11/11/2024    MCHC 32.2 11/11/2024    RDW 12.6 11/11/2024     11/11/2024    MPV 9.4 11/11/2024    MONO 7 10/14/2024    MONO 0.3 10/14/2024    BASO 0.1 10/14/2024    EOSINOPHIL 3 10/14/2024    BASOPHIL 1 10/14/2024       Lipid Panel:  Lab Results   Component Value Date    CHOL 127 10/14/2024    HDL 67 10/14/2024    LDLCALC 45 10/14/2024    TRIG 74 10/14/2024    TOTALCHOLEST 3.2 02/09/2022        Assessment & Plan:  1. Preoperative clearance  Assessment & Plan:  Cleared for left shoulder scope/RCR with general anesthesia. No past problems with anesthesia. Blood pressure and chronic conditions stable. Hold mounjaro one week before. Hold mobic and all NSAIDS week before surgery.       2. Type 2 diabetes mellitus without complication, without long-term current use of insulin  Overview:  Hold metformin and mounjaro. Prior to surgery.     Orders:  -     POCT Glucose, Hand-Held Device    3. Obstructive sleep apnea  Assessment & Plan:  Wearing CPAP every night for at least 8 hours. Feeling better with CPAP, notify anesthesia. Adjusted settings with Dr Carrasco with less pressure and resting better.       4. Immunization due  -     influenza (Flulaval, Fluzone, Fluarix) 45 mcg/0.5 mL IM vaccine (> or = 6 mo) 0.5 mL    5. DDD (degenerative disc disease),  cervical  Assessment & Plan:  Planning to see Cervical surgeon tomorrow. Not certain if ready to start with neck or shoulder surgery. Returning from massage and feeling no discomfort at this time.       6. Vitamin D deficiency  Assessment & Plan:  Reminded last check 99, do not take vitamin D for next 3 months then recheck in 3 months.       7. Class 2 obesity due to excess calories without serious comorbidity with body mass index (BMI) of 39.0 to 39.9 in adult  Assessment & Plan:  The patient's BMI has been recorded in the chart. The patient has been provided educational materials regarding the benefits of attaining and maintaining a normal weight. We will continue to address and follow this issue during follow up visits.            Future Appointments   Date Time Provider Department Center   11/14/2024  1:00 PM OA [email or web address] [facility]   2/12/2025 10:40 AM Kiana Bingham DNP [facility]   8/6/2025  1:00 PM Erika Fraga, NP [facility]       Follow up in about 3 months (around 2/12/2025). Call sooner if needed.    Kiana Bingham DNP    Lab Frequency Next Occurrence   Mammo Digital Screening Bilat w/ Robin Once 11/01/2024

## 2024-11-12 NOTE — ASSESSMENT & PLAN NOTE
Wearing CPAP every night for at least 8 hours. Feeling better with CPAP, notify anesthesia. Adjusted settings with Dr Carrasco with less pressure and resting better.

## 2024-11-14 ENCOUNTER — HOSPITAL ENCOUNTER (OUTPATIENT)
Dept: RADIOLOGY | Facility: HOSPITAL | Age: 59
Discharge: HOME | End: 2024-11-14
Attending: NURSE PRACTITIONER
Payer: COMMERCIAL

## 2024-11-14 DIAGNOSIS — Z12.31 SCREENING MAMMOGRAM FOR BREAST CANCER: ICD-10-CM

## 2024-11-14 PROCEDURE — 77067 SCR MAMMO BI INCL CAD: CPT | Mod: TC

## 2024-11-22 DIAGNOSIS — R92.8 ABNORMAL MAMMOGRAM OF LEFT BREAST: Primary | ICD-10-CM

## 2024-12-09 DIAGNOSIS — G25.81 RESTLESS LEG SYNDROME: ICD-10-CM

## 2024-12-09 RX ORDER — ROPINIROLE 2 MG/1
TABLET, FILM COATED ORAL
Qty: 90 TABLET | Refills: 0 | Status: SHIPPED | OUTPATIENT
Start: 2024-12-09

## 2024-12-09 RX ORDER — MELOXICAM 15 MG/1
TABLET ORAL
Qty: 30 TABLET | Refills: 3 | Status: SHIPPED | OUTPATIENT
Start: 2024-12-09

## 2025-01-13 ENCOUNTER — HOSPITAL ENCOUNTER (OUTPATIENT)
Dept: RADIOLOGY | Facility: HOSPITAL | Age: 60
Discharge: HOME | End: 2025-01-13
Attending: NURSE PRACTITIONER
Payer: COMMERCIAL

## 2025-01-13 DIAGNOSIS — R92.8 ABNORMAL MAMMOGRAM OF LEFT BREAST: ICD-10-CM

## 2025-01-13 DIAGNOSIS — E11.9 CONTROLLED TYPE 2 DIABETES MELLITUS WITHOUT COMPLICATION, WITHOUT LONG-TERM CURRENT USE OF INSULIN: ICD-10-CM

## 2025-01-13 PROCEDURE — 77061 BREAST TOMOSYNTHESIS UNI: CPT | Mod: 26,LT,, | Performed by: STUDENT IN AN ORGANIZED HEALTH CARE EDUCATION/TRAINING PROGRAM

## 2025-01-13 PROCEDURE — 76642 ULTRASOUND BREAST LIMITED: CPT | Mod: TC,LT

## 2025-01-13 PROCEDURE — 77065 DX MAMMO INCL CAD UNI: CPT | Mod: 26,LT,, | Performed by: STUDENT IN AN ORGANIZED HEALTH CARE EDUCATION/TRAINING PROGRAM

## 2025-01-13 PROCEDURE — 77061 BREAST TOMOSYNTHESIS UNI: CPT | Mod: TC,LT

## 2025-01-13 PROCEDURE — 76642 ULTRASOUND BREAST LIMITED: CPT | Mod: 26,LT,, | Performed by: STUDENT IN AN ORGANIZED HEALTH CARE EDUCATION/TRAINING PROGRAM

## 2025-01-13 RX ORDER — TIRZEPATIDE 15 MG/.5ML
INJECTION, SOLUTION SUBCUTANEOUS
Qty: 2 ML | Refills: 5 | Status: SHIPPED | OUTPATIENT
Start: 2025-01-13

## 2025-01-15 NOTE — PROGRESS NOTES
Benign cyst noted 9o'clock left breast, recommendation is to return to annual mammogram screening yearly or sooner prn changes

## 2025-02-12 ENCOUNTER — OFFICE VISIT (OUTPATIENT)
Dept: FAMILY MEDICINE | Facility: CLINIC | Age: 60
End: 2025-02-12
Payer: COMMERCIAL

## 2025-02-12 VITALS
TEMPERATURE: 97 F | OXYGEN SATURATION: 96 % | BODY MASS INDEX: 40.48 KG/M2 | WEIGHT: 220 LBS | HEIGHT: 62 IN | SYSTOLIC BLOOD PRESSURE: 124 MMHG | RESPIRATION RATE: 20 BRPM | HEART RATE: 87 BPM | DIASTOLIC BLOOD PRESSURE: 72 MMHG

## 2025-02-12 DIAGNOSIS — F33.1 DEPRESSION, MAJOR, RECURRENT, MODERATE: ICD-10-CM

## 2025-02-12 DIAGNOSIS — E66.01 MORBID (SEVERE) OBESITY DUE TO EXCESS CALORIES: ICD-10-CM

## 2025-02-12 DIAGNOSIS — E03.4 HYPOTHYROIDISM DUE TO ACQUIRED ATROPHY OF THYROID: ICD-10-CM

## 2025-02-12 DIAGNOSIS — F41.1 GENERALIZED ANXIETY DISORDER: Chronic | ICD-10-CM

## 2025-02-12 DIAGNOSIS — G47.419 PRIMARY NARCOLEPSY WITHOUT CATAPLEXY: ICD-10-CM

## 2025-02-12 DIAGNOSIS — E55.9 VITAMIN D DEFICIENCY: Primary | ICD-10-CM

## 2025-02-12 DIAGNOSIS — J32.9 CHRONIC RHINOSINUSITIS: ICD-10-CM

## 2025-02-12 DIAGNOSIS — G25.81 RESTLESS LEGS: ICD-10-CM

## 2025-02-12 DIAGNOSIS — E11.59 TYPE 2 DIABETES MELLITUS WITH OTHER CIRCULATORY COMPLICATIONS: Primary | ICD-10-CM

## 2025-02-12 DIAGNOSIS — M50.30 DDD (DEGENERATIVE DISC DISEASE), CERVICAL: ICD-10-CM

## 2025-02-12 DIAGNOSIS — G47.33 OBSTRUCTIVE SLEEP APNEA: ICD-10-CM

## 2025-02-12 DIAGNOSIS — E78.2 MIXED HYPERLIPIDEMIA: ICD-10-CM

## 2025-02-12 DIAGNOSIS — J02.9 PHARYNGITIS, UNSPECIFIED ETIOLOGY: ICD-10-CM

## 2025-02-12 LAB
CTP QC/QA: YES
S PYO RRNA THROAT QL PROBE: NEGATIVE

## 2025-02-12 PROCEDURE — 87880 STREP A ASSAY W/OPTIC: CPT | Mod: QW,,, | Performed by: NURSE PRACTITIONER

## 2025-02-12 PROCEDURE — 1160F RVW MEDS BY RX/DR IN RCRD: CPT | Mod: CPTII,,, | Performed by: NURSE PRACTITIONER

## 2025-02-12 PROCEDURE — 3008F BODY MASS INDEX DOCD: CPT | Mod: CPTII,,, | Performed by: NURSE PRACTITIONER

## 2025-02-12 PROCEDURE — 99213 OFFICE O/P EST LOW 20 MIN: CPT | Mod: ,,, | Performed by: NURSE PRACTITIONER

## 2025-02-12 PROCEDURE — 3074F SYST BP LT 130 MM HG: CPT | Mod: CPTII,,, | Performed by: NURSE PRACTITIONER

## 2025-02-12 PROCEDURE — 3078F DIAST BP <80 MM HG: CPT | Mod: CPTII,,, | Performed by: NURSE PRACTITIONER

## 2025-02-12 PROCEDURE — 1159F MED LIST DOCD IN RCRD: CPT | Mod: CPTII,,, | Performed by: NURSE PRACTITIONER

## 2025-02-12 RX ORDER — ERGOCALCIFEROL 1.25 MG/1
CAPSULE ORAL
Qty: 13 CAPSULE | Refills: 1 | Status: SHIPPED | OUTPATIENT
Start: 2025-02-12

## 2025-02-12 NOTE — ASSESSMENT & PLAN NOTE
Taking Haldol 2 mg nightly q.h.s. with Klonopin 0.5 mg b.i.d. Trintellix 20 mg p.o. Q D with trazodone 300 mg q.h.s.   feels that the medication is working well to control symptoms.

## 2025-02-12 NOTE — ASSESSMENT & PLAN NOTE
Sore throat yesterday with slight improvement today. Strep negative   Continue singulair and notify if fails to improve

## 2025-02-12 NOTE — PROGRESS NOTES
"SUBJECTIVE:  Brenda Humphreys is a 60 y.o. female here alone for Follow-up (Chronic condition and weight loss)      History of Present Illness    CHIEF COMPLAINT:  Patient presents today for follow up of chronic conditions    PAIN MANAGEMENT:  She reports mild, localized post-surgical shoulder pain that she describes as "livable" and improved with current pain medicine regimen.     MEDICAL HISTORY:  She has pre-diabetes.    MEDICATIONS:  She continues Gabapentin for nerve pain with good effect, daily Klonopin, Trintellix, and as-needed Flexeril. She has history of using Questran for diarrhea.    EXERCISE:  She maintains regular physical activity with daily walks, with occasional exceptions.      ROS:  General: -fever, -chills, -fatigue, -weight gain, -weight loss  Eyes: -vision changes, -redness, -discharge  ENT: -ear pain, -nasal congestion, -sore throat  Cardiovascular: -chest pain, -palpitations, -lower extremity edema  Respiratory: -cough, -shortness of breath  Gastrointestinal: -abdominal pain, -nausea, -vomiting, -diarrhea, -constipation, -blood in stool  Genitourinary: -dysuria, -hematuria, -frequency  Musculoskeletal: +joint pain, -muscle pain  Skin: -rash, -lesion  Neurological: -headache, -dizziness, -numbness, -tingling  Psychiatric: -anxiety, -depression, -sleep difficulty          Sandee allergies, medications, history, and problem list were updated as appropriate.      A comprehensive review of symptoms was completed and negative except as noted above.    OBJECTIVE:  Vital signs  Vitals:    02/12/25 1033   BP: 124/72   BP Location: Left arm   Patient Position: Sitting   Pulse: 87   Resp: 20   Temp: 97.2 °F (36.2 °C)   SpO2: 96%   Weight: 99.8 kg (220 lb)   Height: 5' 2" (1.575 m)        Physical Exam  Vitals and nursing note reviewed.   Constitutional:       Appearance: Normal appearance.   HENT:      Head: Normocephalic and atraumatic.      Right Ear: Tympanic membrane, ear canal and external ear " normal.      Left Ear: Tympanic membrane, ear canal and external ear normal.      Nose: Nose normal.      Mouth/Throat:      Mouth: Mucous membranes are moist.      Pharynx: Oropharynx is clear. Posterior oropharyngeal erythema present.   Eyes:      Extraocular Movements: Extraocular movements intact.      Conjunctiva/sclera: Conjunctivae normal.      Pupils: Pupils are equal, round, and reactive to light.   Cardiovascular:      Rate and Rhythm: Normal rate and regular rhythm.      Pulses: Normal pulses.      Heart sounds: Normal heart sounds.   Pulmonary:      Effort: Pulmonary effort is normal.      Breath sounds: Normal breath sounds.   Abdominal:      General: Abdomen is flat. Bowel sounds are normal.      Palpations: Abdomen is soft.   Musculoskeletal:         General: Normal range of motion.      Cervical back: Normal range of motion.   Skin:     General: Skin is warm and dry.      Capillary Refill: Capillary refill takes less than 2 seconds.   Neurological:      General: No focal deficit present.      Mental Status: She is alert.   Psychiatric:         Mood and Affect: Mood normal.         Behavior: Behavior normal.         Thought Content: Thought content normal.         Judgment: Judgment normal.          Office Visit on 02/12/2025   Component Date Value Ref Range Status    Rapid Strep A Screen 02/12/2025 Negative  Negative Final     Acceptable 02/12/2025 Yes   Final          ASSESSMENT/PLAN:  Assessment & Plan    E11.59 Type 2 diabetes mellitus with other circulatory complications  E66.01 Morbid (severe) obesity due to excess calories  F33.1 Depression, major, recurrent, moderate    IMPRESSION:  - Continued weight loss medication despite variable efficacy, considering potential cardiovascular benefits for pre-diabetic patient  - Maintained current thyroid management, as levels are stable  - Assessed efficacy of current medication regimen for various chronic conditions including nerve pain,  allergies, restless legs syndrome, and psychiatric issues  - Evaluated need for continued use of Questran for previous diarrhea issues    ** DIAGNOSES NOT IN VISIT DX OR FOR REVIEW **  E11.59 TYPE 2 DIABETES MELLITUS WITH OTHER CIRCULATORY COMPLICATIONS:  - Continue gabapentin for nerve pain.  - Monitor pre-diabetic condition and current medication regimen (likely Ozempic/semaglutide).  - Patient has experienced significant weight loss with the medication without reported side effects.  - Assess potential cardiovascular benefits of the medication.  - Recommend continuing the current medication.    E66.01 MORBID (SEVERE) OBESITY DUE TO EXCESS CALORIES:  - Continue weight loss injection (likely Ozempic/semaglutide) and monitor progress.  - Patient has achieved significant weight loss, though results may vary among individuals.  - Encourage patient's initiative to start walking and emphasize the importance of daily exercise for optimal weight management.  - Recommend continuing the current medication regimen.    F33.1 DEPRESSION, MAJOR, RECURRENT, MODERATE:  - Continue current medication regimen, including Trintellix, Haldol, and daily Klonopin.  - Monitor patient's response to medications, which are currently working well without issues.  - Recommend continuation of the current treatment plan.        1. Type 2 diabetes mellitus with other circulatory complications    2. Morbid (severe) obesity due to excess calories    3. Depression, major, recurrent, moderate  Assessment & Plan:  Taking Haldol 2 mg nightly q.h.s. with Klonopin 0.5 mg b.i.d. Trintellix 20 mg p.o. Q D with trazodone 300 mg q.h.s.   feels that the medication is working well to control symptoms.        4. Pharyngitis, unspecified etiology  -     POCT rapid strep A    5. DDD (degenerative disc disease), cervical    6. Restless legs  Assessment & Plan:  Requip 2 mg qhs. Gabapentin 200 mg qhs  Well controlled         7. Generalized anxiety disorder    8.  Mixed hyperlipidemia    9. Hypothyroidism due to acquired atrophy of thyroid    10. Obstructive sleep apnea    11. Primary narcolepsy without cataplexy    12. Chronic rhinosinusitis  Assessment & Plan:  Sore throat yesterday with slight improvement today. Strep negative   Continue singulair and notify if fails to improve            Follow Up:  Follow up in about 2 months (around 4/12/2025).    If a referral was sent and you are not notified and scheduled with specialist within 2 weeks, please notify office to ensure specialty appointment is scheduled in a timely manner.   Discussed the diagnosis, prognosis, plan of care, chronic nature of and indications for, risks and benefits of treatment for conditions.  Continue all medications as prescribed unless otherwise noted.   Call if patient develops other symptoms or if not better in 2-3 days and sooner if worse. Emphasized the importance of compliance with all recommendations, including medication use and timely follow up. Instructed to return as noted be or sooner if patient develops any other problems or if there are any other additional questions or concerns.      This note was generated with the assistance of ambient listening technology. Verbal consent was obtained by the patient and accompanying visitor(s) for the recording of patient appointment to facilitate this note. I attest to having reviewed and edited the generated note for accuracy, though some syntax or spelling errors may persist. Please contact the author of this note for any clarification.

## 2025-02-21 DIAGNOSIS — E03.9 ACQUIRED HYPOTHYROIDISM: ICD-10-CM

## 2025-02-24 RX ORDER — LEVOTHYROXINE SODIUM 88 UG/1
TABLET ORAL
Qty: 90 TABLET | Refills: 1 | Status: SHIPPED | OUTPATIENT
Start: 2025-02-24

## 2025-03-12 DIAGNOSIS — G25.81 RESTLESS LEG SYNDROME: ICD-10-CM

## 2025-03-12 RX ORDER — ROPINIROLE 2 MG/1
TABLET, FILM COATED ORAL
Qty: 90 TABLET | Refills: 0 | Status: SHIPPED | OUTPATIENT
Start: 2025-03-12

## 2025-03-19 RX ORDER — CYCLOBENZAPRINE HCL 10 MG
TABLET ORAL
Qty: 90 TABLET | Refills: 1 | Status: SHIPPED | OUTPATIENT
Start: 2025-03-19

## 2025-04-09 DIAGNOSIS — E11.9 TYPE 2 DIABETES MELLITUS WITHOUT COMPLICATION, WITHOUT LONG-TERM CURRENT USE OF INSULIN: ICD-10-CM

## 2025-04-09 DIAGNOSIS — E78.2 MIXED HYPERLIPIDEMIA: ICD-10-CM

## 2025-04-09 RX ORDER — ROSUVASTATIN CALCIUM 10 MG/1
TABLET, COATED ORAL
Qty: 90 TABLET | Refills: 2 | Status: SHIPPED | OUTPATIENT
Start: 2025-04-09

## 2025-04-15 ENCOUNTER — RESULTS FOLLOW-UP (OUTPATIENT)
Dept: FAMILY MEDICINE | Facility: CLINIC | Age: 60
End: 2025-04-15
Payer: COMMERCIAL

## 2025-04-23 ENCOUNTER — OFFICE VISIT (OUTPATIENT)
Dept: FAMILY MEDICINE | Facility: CLINIC | Age: 60
End: 2025-04-23
Payer: COMMERCIAL

## 2025-04-23 VITALS
OXYGEN SATURATION: 99 % | HEIGHT: 62 IN | BODY MASS INDEX: 41.41 KG/M2 | HEART RATE: 91 BPM | DIASTOLIC BLOOD PRESSURE: 84 MMHG | SYSTOLIC BLOOD PRESSURE: 132 MMHG | WEIGHT: 225 LBS | TEMPERATURE: 98 F

## 2025-04-23 DIAGNOSIS — G25.81 RESTLESS LEGS: ICD-10-CM

## 2025-04-23 DIAGNOSIS — E11.9 CONTROLLED TYPE 2 DIABETES MELLITUS WITHOUT COMPLICATION, WITHOUT LONG-TERM CURRENT USE OF INSULIN: ICD-10-CM

## 2025-04-23 DIAGNOSIS — J45.909 ASTHMA, UNSPECIFIED ASTHMA SEVERITY, UNSPECIFIED WHETHER COMPLICATED, UNSPECIFIED WHETHER PERSISTENT: ICD-10-CM

## 2025-04-23 DIAGNOSIS — M50.30 DDD (DEGENERATIVE DISC DISEASE), CERVICAL: ICD-10-CM

## 2025-04-23 DIAGNOSIS — E78.2 MIXED HYPERLIPIDEMIA: ICD-10-CM

## 2025-04-23 DIAGNOSIS — E11.9 TYPE 2 DIABETES MELLITUS WITHOUT COMPLICATION, WITHOUT LONG-TERM CURRENT USE OF INSULIN: Primary | ICD-10-CM

## 2025-04-23 DIAGNOSIS — G25.81 RESTLESS LEG SYNDROME: ICD-10-CM

## 2025-04-23 DIAGNOSIS — E03.9 ACQUIRED HYPOTHYROIDISM: ICD-10-CM

## 2025-04-23 DIAGNOSIS — K21.9 GASTROESOPHAGEAL REFLUX DISEASE, UNSPECIFIED WHETHER ESOPHAGITIS PRESENT: ICD-10-CM

## 2025-04-23 DIAGNOSIS — E78.5 HYPERLIPIDEMIA LDL GOAL <70: ICD-10-CM

## 2025-04-23 LAB — GLUCOSE SERPL-MCNC: 76 MG/DL (ref 70–110)

## 2025-04-23 RX ORDER — GABAPENTIN 100 MG/1
CAPSULE ORAL
Qty: 270 CAPSULE | Refills: 2 | OUTPATIENT
Start: 2025-04-23

## 2025-04-23 RX ORDER — ROSUVASTATIN CALCIUM 10 MG/1
10 TABLET, COATED ORAL
Qty: 30 TABLET | Refills: 1 | Status: SHIPPED | OUTPATIENT
Start: 2025-04-24

## 2025-04-23 RX ORDER — MELOXICAM 15 MG/1
TABLET ORAL
Qty: 30 TABLET | Refills: 4 | OUTPATIENT
Start: 2025-04-23

## 2025-04-23 RX ORDER — ROPINIROLE 2 MG/1
TABLET, FILM COATED ORAL
Qty: 90 TABLET | Refills: 0 | Status: SHIPPED | OUTPATIENT
Start: 2025-04-23

## 2025-04-23 RX ORDER — TIRZEPATIDE 15 MG/.5ML
15 INJECTION, SOLUTION SUBCUTANEOUS DAILY PRN
Qty: 2 ML | Refills: 5 | Status: SHIPPED | OUTPATIENT
Start: 2025-04-23

## 2025-04-23 RX ORDER — MONTELUKAST SODIUM 10 MG/1
TABLET ORAL
Qty: 90 TABLET | Refills: 0 | Status: SHIPPED | OUTPATIENT
Start: 2025-04-23

## 2025-04-23 RX ORDER — LEVOCETIRIZINE DIHYDROCHLORIDE 5 MG/1
5 TABLET, FILM COATED ORAL NIGHTLY
Qty: 90 TABLET | Refills: 1 | Status: SHIPPED | OUTPATIENT
Start: 2025-04-23

## 2025-04-23 RX ORDER — MODAFINIL 200 MG/1
200 TABLET ORAL
COMMUNITY
Start: 2025-02-21 | End: 2025-04-23

## 2025-04-23 RX ORDER — LEVOTHYROXINE SODIUM 88 UG/1
88 TABLET ORAL
Qty: 90 TABLET | Refills: 1 | Status: SHIPPED | OUTPATIENT
Start: 2025-04-23

## 2025-04-23 RX ORDER — HYDROGEN PEROXIDE 3 %
SOLUTION, NON-ORAL MISCELLANEOUS
Qty: 90 CAPSULE | Refills: 1 | Status: SHIPPED | OUTPATIENT
Start: 2025-04-23

## 2025-04-23 RX ORDER — HALOPERIDOL 2 MG/1
4 TABLET ORAL NIGHTLY
COMMUNITY
Start: 2025-04-21

## 2025-04-23 RX ORDER — CYCLOBENZAPRINE HCL 10 MG
TABLET ORAL
Qty: 90 TABLET | Refills: 2 | OUTPATIENT
Start: 2025-04-23

## 2025-04-23 NOTE — PROGRESS NOTES
Patient ID: Brenda Humphreys  : 1965     Chief Complaint: Follow-up (F/u on labs done 25. No new complaints or problems )    Allergies: Patient has no known allergies.     History of Present Illness:  The patient is a 60 y.o. White female who presents to clinic for evaluation and management with a chief complaint of Follow-up (F/u on labs done 25. No new complaints or problems )   History of Present Illness      She is completing physical therapy and still with left shoulder limited. Taking all medication and withoutmobic too painful. Started with cross fit with  3 x week for 45 minutes. Tolerating.          Past Medical History:  has a past medical history of Basal cell carcinoma of neck, face, and arm, Carpal tunnel syndrome, Chronic gastritis, Decreased estrogen level, Deviated nasal septum, Fatigue, GERD (gastroesophageal reflux disease), H/O endocrine disorder, Hiatal hernia, Hormone replacement therapy, Hypertrophy of nasal turbinates, Insomnia, Iron deficiency anemia, unspecified, Memory impairment, Metabolic syndrome X, Mixed hyperlipidemia, Narcolepsy, Obstructive sleep apnea, Phonic tic, Prediabetes, Radicular pain, Restless legs, Stress incontinence (female) (male), Ulcer of nasal septum, Vitamin B12 deficiency, and Vitamin D deficiency.    Social History:  reports that she has never smoked. She has never been exposed to tobacco smoke. She has never used smokeless tobacco. She reports current alcohol use. She reports that she does not use drugs.    Care Team: Patient Care Team:  Kiana Bingham DNP as PCP - General (Family Medicine)  [facility]  [facility], [facility]     Current Medications:  Current Outpatient Medications   Medication Instructions    cholestyramine (QUESTRAN) 4 gram packet 1 packet, 2 times daily    clonazePAM (KLONOPIN) 0.5 MG tablet Take 1 tablet (0.5 mg total) by mouth 2 (two) times daily as needed for Anxiety for 14  days, THEN 1 tablet (0.5 mg total) daily as needed for Anxiety.    cloNIDine (CATAPRES) 0.1 mg, Oral, Nightly    cyclobenzaprine (FLEXERIL) 10 MG tablet TAKE ONE(1) TAB BY MOUTH 3 TIMES DAILY AS NEEDED FOR MUSCLE SPASMS &/OR PAIN    ergocalciferol (ERGOCALCIFEROL) 50,000 unit Cap TAKE ONE(1) CAP BY MOUTH ONCE A WEEK ON THE SAME DAY EACH WEEK FOR VITAMIN D /DO NOT CRUSH OR CHEW    esomeprazole (NEXIUM) 20 MG capsule TAKE ONE(1) CAP BY MOUTH ONCE DAY ON EMPTY STOMACH FOR STOMACH &/OR REFLUX /DO NOT CRUSH OR CHEW    estradioL (ESTRACE) 1 mg, Oral, Daily    gabapentin (NEURONTIN) 100 MG capsule Take one tablet at 4 pm and 2 at bedtime if needed for restless legs    haloperidoL (HALDOL) 4 mg, Nightly    hydrOXYzine (ATARAX) 50 mg, Nightly    levocetirizine (XYZAL) 5 mg, Oral, Nightly    levothyroxine (SYNTHROID) 88 mcg, Oral, Before breakfast    meclizine (ANTIVERT) 25 mg, Oral, 3 times daily PRN    meloxicam (MOBIC) 15 MG tablet TAKE ONE(1) TAB BY MOUTH ONCE A DAY WITH FULL GLASS OF WATER & FOOD FOR PAIN, INFLAMMATION, &/OR ARTHRITIS    mometasone 0.1% (ELOCON) 0.1 % cream 1 application , 2 times daily PRN    montelukast (SINGULAIR) 10 mg tablet TAKE ONE(1) TAB BY MOUTH EVERY NIGHT AT BEDTIME FOR ALLERGIES    MOUNJARO 15 mg, Subcutaneous, Daily PRN    rOPINIRole (REQUIP) 2 MG tablet TAKE ONE(1) TAB BY MOUTH EVERY NIGHT AT BEDTIME FOR RESTLESS LEGS    [START ON 4/24/2025] rosuvastatin (CRESTOR) 10 mg, Oral, Twice weekly    traZODone (DESYREL) 300 mg, Oral, Nightly    triamcinolone acetonide 0.1% (KENALOG) 0.1 % paste SMARTSIG:TO TEETH    TRINTELLIX 20 mg, Oral, Daily       Review of Systems   Constitutional:  Positive for activity change, appetite change and fatigue.   HENT:  Positive for nasal congestion.    Respiratory:  Negative for chest tightness and shortness of breath.    Cardiovascular:  Negative for palpitations and leg swelling.   Gastrointestinal: Negative.    Allergic/Immunologic: Positive for environmental  "allergies.   Neurological: Negative.  Negative for light-headedness.   Psychiatric/Behavioral:  Positive for sleep disturbance. Negative for depressed mood, dysphoric mood and suicidal ideas. The patient is not nervous/anxious.    All other systems reviewed and are negative.       Visit Vitals  /84 (BP Location: Left arm, Patient Position: Sitting)   Pulse 91   Temp 97.5 °F (36.4 °C)   Ht 5' 2" (1.575 m)   Wt 102.1 kg (225 lb)   LMP  (LMP Unknown)   SpO2 99%   BMI 41.15 kg/m²       Physical Exam  Constitutional:       Appearance: Normal appearance.   HENT:      Head: Normocephalic.      Nose: Nose normal.      Mouth/Throat:      Mouth: Mucous membranes are dry.   Eyes:      Extraocular Movements: Extraocular movements intact.      Conjunctiva/sclera: Conjunctivae normal.   Cardiovascular:      Rate and Rhythm: Normal rate and regular rhythm.      Pulses: Normal pulses.   Pulmonary:      Effort: Pulmonary effort is normal.      Breath sounds: Normal breath sounds.   Abdominal:      General: Bowel sounds are normal.      Palpations: Abdomen is soft.   Musculoskeletal:      Right shoulder: Decreased range of motion.      Left shoulder: Decreased range of motion.      Cervical back: Spasms present. Decreased range of motion.      Lumbar back: Decreased range of motion.   Skin:     General: Skin is warm and dry.      Capillary Refill: Capillary refill takes less than 2 seconds.   Neurological:      General: No focal deficit present.      Mental Status: She is alert.   Psychiatric:         Mood and Affect: Mood normal.          Labs Reviewed:  Chemistry:  Lab Results   Component Value Date     04/14/2025    K 4.7 04/14/2025    BUN 8 04/14/2025    CREATININE 1.05 (H) 04/14/2025    EGFRNORACEVR 61 04/14/2025    GLUCOSE 104 11/11/2024    CALCIUM 8.8 04/14/2025    ALKPHOS 109 09/21/2021    LABPROT 7.1 09/21/2021    ALBUMIN 4.2 04/14/2025    AST 15 04/14/2025    ALT 15 04/14/2025    WIBEARSR18QP 72.0 04/14/2025    " TSH 0.824 04/14/2025        Lab Results   Component Value Date    HGBA1C 5.1 10/14/2024        Hematology:  Lab Results   Component Value Date    WBC 5.34 11/11/2024    RBC 5.04 11/11/2024    HGB 13.8 11/11/2024    HCT 42.8 11/11/2024    MCV 84.9 11/11/2024    MCH 27.4 11/11/2024    MCHC 32.2 11/11/2024    RDW 12.6 11/11/2024     11/11/2024    MPV 9.4 11/11/2024    MONO 7 10/14/2024    MONO 0.3 10/14/2024    BASO 0.1 10/14/2024    EOSINOPHIL 3 10/14/2024    BASOPHIL 1 10/14/2024       Lipid Panel:  Lab Results   Component Value Date    CHOL 143 04/14/2025    HDL 86 04/14/2025    LDLCALC 45 04/14/2025    TRIG 56 04/14/2025    TOTALCHOLEST 3.2 02/09/2022        Assessment & Plan:  1. Type 2 diabetes mellitus without complication, without long-term current use of insulin  Overview:  Hold metformin and mounjaro. Prior to surgery.     Orders:  -     POCT Glucose, Hand-Held Device  -     rosuvastatin (CRESTOR) 10 MG tablet; Take 1 tablet (10 mg total) by mouth twice a week.  Dispense: 30 tablet; Refill: 1    2. Controlled type 2 diabetes mellitus without complication, without long-term current use of insulin  -     tirzepatide (MOUNJARO) 15 mg/0.5 mL PnIj; Inject 15 mg into the skin daily as needed (ppain).  Dispense: 2 mL; Refill: 5    3. DDD (degenerative disc disease), cervical  Assessment & Plan:  Has seen cervical surgeon but declines surgery. Taking mobic 15 mg daily prn. Helps pain, but take sparingly and be cautious with exercise.       4. Hyperlipidemia LDL goal <70  Assessment & Plan:  Decrease crestor 10 mg twice week. Recheck cmp, flp in 6 months.       5. Mixed hyperlipidemia  -     rosuvastatin (CRESTOR) 10 MG tablet; Take 1 tablet (10 mg total) by mouth twice a week.  Dispense: 30 tablet; Refill: 1    6. Asthma, unspecified asthma severity, unspecified whether complicated, unspecified whether persistent  -     montelukast (SINGULAIR) 10 mg tablet; TAKE ONE(1) TAB BY MOUTH EVERY NIGHT AT BEDTIME FOR  ALLERGIES  Dispense: 90 tablet; Refill: 0    7. Acquired hypothyroidism  -     levothyroxine (SYNTHROID) 88 MCG tablet; Take 1 tablet (88 mcg total) by mouth before breakfast.  Dispense: 90 tablet; Refill: 1    8. Restless leg syndrome  -     rOPINIRole (REQUIP) 2 MG tablet; TAKE ONE(1) TAB BY MOUTH EVERY NIGHT AT BEDTIME FOR RESTLESS LEGS  Dispense: 90 tablet; Refill: 0    Other orders  -     levocetirizine (XYZAL) 5 MG tablet; Take 1 tablet (5 mg total) by mouth every evening.  Dispense: 90 tablet; Refill: 1         Assessment & Plan               Future Appointments   Date Time Provider Department Center   8/6/2025  1:00 PM Erika Fraga NP [facility]   10/13/2025  7:25 AM NURSE, [facility]       Follow up in about 6 months (around 10/23/2025). Call sooner if needed.      This note was generated with the assistance of ambient listening technology. Verbal consent was obtained by the patient and accompanying visitor(s) for the recording of patient appointment to facilitate this note. I attest to having reviewed and edited the generated note for accuracy, though some syntax or spelling errors may persist. Please contact the author of this note for any clarification.      Kiana Bingham, DNP

## 2025-04-23 NOTE — ASSESSMENT & PLAN NOTE
Has seen cervical surgeon but declines surgery. Taking mobic 15 mg daily prn. Helps pain, but take sparingly and be cautious with exercise.

## 2025-05-20 ENCOUNTER — OFFICE VISIT (OUTPATIENT)
Dept: FAMILY MEDICINE | Facility: CLINIC | Age: 60
End: 2025-05-20
Payer: COMMERCIAL

## 2025-05-20 VITALS
TEMPERATURE: 98 F | HEIGHT: 62 IN | DIASTOLIC BLOOD PRESSURE: 72 MMHG | HEART RATE: 92 BPM | WEIGHT: 222 LBS | BODY MASS INDEX: 40.85 KG/M2 | SYSTOLIC BLOOD PRESSURE: 132 MMHG | OXYGEN SATURATION: 98 %

## 2025-05-20 DIAGNOSIS — R19.7 DIARRHEA OF PRESUMED INFECTIOUS ORIGIN: Primary | ICD-10-CM

## 2025-05-20 PROCEDURE — 99213 OFFICE O/P EST LOW 20 MIN: CPT | Mod: ,,, | Performed by: NURSE PRACTITIONER

## 2025-05-20 PROCEDURE — 1159F MED LIST DOCD IN RCRD: CPT | Mod: CPTII,,, | Performed by: NURSE PRACTITIONER

## 2025-05-20 PROCEDURE — 3008F BODY MASS INDEX DOCD: CPT | Mod: CPTII,,, | Performed by: NURSE PRACTITIONER

## 2025-05-20 PROCEDURE — 1160F RVW MEDS BY RX/DR IN RCRD: CPT | Mod: CPTII,,, | Performed by: NURSE PRACTITIONER

## 2025-05-20 PROCEDURE — 3078F DIAST BP <80 MM HG: CPT | Mod: CPTII,,, | Performed by: NURSE PRACTITIONER

## 2025-05-20 PROCEDURE — 3075F SYST BP GE 130 - 139MM HG: CPT | Mod: CPTII,,, | Performed by: NURSE PRACTITIONER

## 2025-05-20 NOTE — ASSESSMENT & PLAN NOTE
Check stool for OB x 3, C & S, C Diff, OCP, bland diet, pepto bismol.after collecting. Increase food as tolerated. Lost 3# since onset.

## 2025-05-20 NOTE — PROGRESS NOTES
Patient ID: Brenda Humphreys  : 1965     Chief Complaint: Diarrhea (X 7 days. Along with rotten burps )    Allergies: Patient has no known allergies.     History of Present Illness:  The patient is a 60 y.o. White female who presents to clinic for evaluation and management with a chief complaint of Diarrhea (X 7 days. Along with rotten burps )   HPI   Began last Wednesday and had thought that it was associated with new vitamins she had taken but she stopped taking the vitamins and no change with the diarrhea.  No fever at the onset or vomiting but did experience a headache more fatigued during the time that the symptoms 1st began.  Not noticing blood in her stool but is noticing multiple 5-6 loose bowel movements slimy brown stool that has not decreased at all   She has not had any vomiting fever throughout the has continue to eat a lean protein with vegetables and has continued to lose weight but is now beginning to feel more with less urine.     Past Medical History:  has a past medical history of Basal cell carcinoma of neck, face, and arm, Carpal tunnel syndrome, Chronic gastritis, Decreased estrogen level, Deviated nasal septum, Fatigue, GERD (gastroesophageal reflux disease), H/O endocrine disorder, Hiatal hernia, Hormone replacement therapy, Hypertrophy of nasal turbinates, Insomnia, Iron deficiency anemia, unspecified, Memory impairment, Metabolic syndrome X, Mixed hyperlipidemia, Narcolepsy, Obstructive sleep apnea, Phonic tic, Prediabetes, Radicular pain, Restless legs, Stress incontinence (female) (male), Ulcer of nasal septum, Vitamin B12 deficiency, and Vitamin D deficiency.    Social History:  reports that she has never smoked. She has never been exposed to tobacco smoke. She has never used smokeless tobacco. She reports current alcohol use. She reports that she does not use drugs.    Care Team: Patient Care Team:  Kiana Bingham DNP as PCP - General (Family Medicine)  [facility], [facility]  [facility], [facility] [place]     Current Medications:  Current Outpatient Medications   Medication Instructions    cholestyramine (QUESTRAN) 4 gram packet 1 packet, 2 times daily    clonazePAM (KLONOPIN) 0.5 MG tablet Take 1 tablet (0.5 mg total) by mouth 2 (two) times daily as needed for Anxiety for 14 days, THEN 1 tablet (0.5 mg total) daily as needed for Anxiety.    cloNIDine (CATAPRES) 0.1 mg, Oral, Nightly    cyclobenzaprine (FLEXERIL) 10 MG tablet TAKE ONE(1) TAB BY MOUTH 3 TIMES DAILY AS NEEDED FOR MUSCLE SPASMS &/OR PAIN    ergocalciferol (ERGOCALCIFEROL) 50,000 unit Cap TAKE ONE(1) CAP BY MOUTH ONCE A WEEK ON THE SAME DAY EACH WEEK FOR VITAMIN D /DO NOT CRUSH OR CHEW    esomeprazole (NEXIUM) 20 MG capsule TAKE ONE(1) CAP BY MOUTH ONCE DAY ON EMPTY STOMACH FOR STOMACH &/OR REFLUX /DO NOT CRUSH OR CHEW    estradioL (ESTRACE) 1 mg, Oral, Daily    gabapentin (NEURONTIN) 100 MG capsule Take one tablet at 4 pm and 2 at bedtime if needed for restless legs    haloperidoL (HALDOL) 4 mg, Nightly    hydrOXYzine (ATARAX) 50 mg, Nightly    levocetirizine (XYZAL) 5 mg, Oral, Nightly    levothyroxine (SYNTHROID) 88 mcg, Oral, Before breakfast    meloxicam (MOBIC) 15 MG tablet TAKE ONE(1) TAB BY MOUTH ONCE A DAY WITH FULL GLASS OF WATER & FOOD FOR PAIN, INFLAMMATION, &/OR ARTHRITIS    mometasone 0.1% (ELOCON) 0.1 % cream 1 application , 2 times daily PRN    montelukast (SINGULAIR) 10 mg tablet TAKE ONE(1) TAB BY MOUTH EVERY NIGHT AT BEDTIME FOR ALLERGIES    MOUNJARO 15 mg, Subcutaneous, Daily PRN    rOPINIRole (REQUIP) 2 MG tablet TAKE ONE(1) TAB BY MOUTH EVERY NIGHT AT BEDTIME FOR RESTLESS LEGS    rosuvastatin (CRESTOR) 10 mg, Oral, Twice weekly    traZODone (DESYREL) 300 mg, Oral, Nightly    TRINTELLIX 20 mg, Oral, Daily       Review of Systems   Constitutional:  Positive for fatigue.   Gastrointestinal:  Positive for change in bowel habit and diarrhea. Negative for abdominal distention, abdominal  "pain, anal bleeding, blood in stool, nausea and reflux.   Musculoskeletal: Negative.    Neurological:  Positive for headaches.        Visit Vitals  /72 (BP Location: Left forearm, Patient Position: Sitting)   Pulse 92   Temp 98.3 °F (36.8 °C)   Ht 5' 2" (1.575 m)   Wt 100.7 kg (222 lb)   LMP  (LMP Unknown)   SpO2 98%   BMI 40.60 kg/m²       Physical Exam  Constitutional:       Appearance: Normal appearance.   HENT:      Head: Normocephalic.      Nose: Nose normal.      Mouth/Throat:      Mouth: Mucous membranes are dry.   Eyes:      Extraocular Movements: Extraocular movements intact.      Conjunctiva/sclera: Conjunctivae normal.   Cardiovascular:      Rate and Rhythm: Normal rate and regular rhythm.      Pulses: Normal pulses.      Heart sounds: Normal heart sounds.   Pulmonary:      Effort: Pulmonary effort is normal.      Breath sounds: Normal breath sounds.   Abdominal:      General: Bowel sounds are increased.      Palpations: Abdomen is soft.      Tenderness: There is no abdominal tenderness.   Musculoskeletal:         General: Normal range of motion.      Cervical back: Normal range of motion.   Skin:     General: Skin is warm and dry.      Capillary Refill: Capillary refill takes less than 2 seconds.   Neurological:      General: No focal deficit present.      Mental Status: She is alert.   Psychiatric:         Mood and Affect: Mood normal.          Labs Reviewed:  Chemistry:  Lab Results   Component Value Date     04/14/2025    K 4.7 04/14/2025    BUN 8 04/14/2025    CREATININE 1.05 (H) 04/14/2025    EGFRNORACEVR 61 04/14/2025    CALCIUM 8.8 04/14/2025    ALKPHOS 109 09/21/2021    ALBUMIN 4.2 04/14/2025    AST 15 04/14/2025    ALT 15 04/14/2025    [id number] 72.0 04/14/2025    TSH 0.824 04/14/2025        Lab Results   Component Value Date    HGBA1C 5.1 10/14/2024        Hematology:  Lab Results   Component Value Date    WBC 5.34 11/11/2024    RBC 5.04 11/11/2024    HGB 13.8 11/11/2024    HCT " 42.8 11/11/2024    MCV 84.9 11/11/2024    MCH 27.4 11/11/2024    MCHC 32.2 11/11/2024    RDW 12.6 11/11/2024     11/11/2024    MPV 9.4 11/11/2024    MONO 7 10/14/2024    MONO 0.3 10/14/2024    BASO 0.1 10/14/2024    EOSINOPHIL 3 10/14/2024    BASOPHIL 1 10/14/2024       Lipid Panel:  Lab Results   Component Value Date    CHOL 143 04/14/2025    HDL 86 04/14/2025    LDLCALC 45 04/14/2025    TRIG 56 04/14/2025    TOTALCHOLEST 3.2 02/09/2022        Assessment & Plan:  1. Diarrhea of presumed infectious origin  Assessment & Plan:  Check stool for OB x 3, C & S, C Diff, OCP, bland diet, pepto bismol.after collecting. Increase food as tolerated. Lost 3# since onset.             Future Appointments   Date Time Provider Department Center   8/6/2025  1:00 PM Erika Fraga NP [facility]   10/13/2025 10:00 AM NURSE, [facility] Alex   10/15/2025  1:00 [facility] [place]       No follow-ups on file. Call sooner if needed.    Kiana Bingham DNP

## 2025-05-21 RX ORDER — MELOXICAM 15 MG/1
TABLET ORAL
Qty: 30 TABLET | Refills: 4 | Status: SHIPPED | OUTPATIENT
Start: 2025-05-21

## 2025-05-24 LAB
CAMPYLOBACTER CULTURE: NEGATIVE
E COLI SHIGA TOXIN EIA: NEGATIVE
Lab: NEGATIVE
OCCULT BLOOD, FECAL, IA: NEGATIVE

## 2025-05-26 ENCOUNTER — DOCUMENTATION ONLY (OUTPATIENT)
Dept: FAMILY MEDICINE | Facility: CLINIC | Age: 60
End: 2025-05-26
Payer: COMMERCIAL

## 2025-06-03 DIAGNOSIS — G25.81 RESTLESS LEGS: ICD-10-CM

## 2025-06-03 RX ORDER — GABAPENTIN 100 MG/1
CAPSULE ORAL
Qty: 270 CAPSULE | Refills: 1 | Status: SHIPPED | OUTPATIENT
Start: 2025-06-03

## 2025-06-09 NOTE — PROGRESS NOTES
"SUBJECTIVE:  Brenda Humphreys is a 60 y.o. female here alone for suture removal.    HPI  Patient presents for suture removal from nostril. Biopsy was done 1 week ago.    Cornelios allergies, medications, history, and problem list were updated as appropriate.    Review of Systems   A comprehensive review of symptoms was completed and negative except as noted above.    OBJECTIVE:  Vital signs  Vitals:    06/10/25 1324   BP: 126/68   BP Location: Right arm   Patient Position: Sitting   Pulse: 85   Resp: 18   Temp: 97.2 °F (36.2 °C)   TempSrc: Temporal   SpO2: 97%   Weight: 101.6 kg (224 lb)   Height: 5' 2" (1.575 m)        Physical Exam  Constitutional:       Appearance: Normal appearance.   HENT:      Head: Normocephalic and atraumatic.      Nose: Nose normal.      Comments: Healed wound to left lower nostril.  No suture noted to be removed.       Mouth/Throat:      Mouth: Mucous membranes are moist.      Pharynx: Oropharynx is clear.   Eyes:      Conjunctiva/sclera: Conjunctivae normal.   Cardiovascular:      Rate and Rhythm: Normal rate and regular rhythm.   Pulmonary:      Effort: Pulmonary effort is normal.      Breath sounds: Normal breath sounds.   Abdominal:      General: Bowel sounds are normal.      Palpations: Abdomen is soft.   Skin:     General: Skin is warm.   Neurological:      Mental Status: She is alert.          ASSESSMENT/PLAN:    1. Visit for suture removal  Comments:  Healed wound to left lower nostri l with no s/s of infection.  No suture noted to be removed.          No results found for this or any previous visit (from the past 24 hours).    Follow Up:  Follow up if symptoms worsen or fail to improve.    If a referral was sent and you are not notified and scheduled with specialist within 2 weeks, please notify office to ensure specialty appointment is scheduled in a timely manner.   Discussed the diagnosis, prognosis, plan of care, chronic nature of and indications for, risks and benefits of " treatment for conditions.  Continue all medications as prescribed unless otherwise noted.   Call if patient develops other symptoms or if not better in 2-3 days and sooner if worse. Emphasized the importance of compliance with all recommendations, including medication use and timely follow up. Instructed to return as noted be or sooner if patient develops any other problems or if there are any other additional questions or concerns.

## 2025-06-10 ENCOUNTER — OFFICE VISIT (OUTPATIENT)
Dept: FAMILY MEDICINE | Facility: CLINIC | Age: 60
End: 2025-06-10
Payer: COMMERCIAL

## 2025-06-10 VITALS
SYSTOLIC BLOOD PRESSURE: 126 MMHG | HEART RATE: 85 BPM | DIASTOLIC BLOOD PRESSURE: 68 MMHG | TEMPERATURE: 97 F | WEIGHT: 224 LBS | BODY MASS INDEX: 41.22 KG/M2 | HEIGHT: 62 IN | RESPIRATION RATE: 18 BRPM | OXYGEN SATURATION: 97 %

## 2025-06-10 DIAGNOSIS — Z48.02 VISIT FOR SUTURE REMOVAL: Primary | ICD-10-CM

## 2025-06-10 PROCEDURE — 1159F MED LIST DOCD IN RCRD: CPT | Mod: CPTII,,, | Performed by: NURSE PRACTITIONER

## 2025-06-10 PROCEDURE — 3074F SYST BP LT 130 MM HG: CPT | Mod: CPTII,,, | Performed by: NURSE PRACTITIONER

## 2025-06-10 PROCEDURE — 3078F DIAST BP <80 MM HG: CPT | Mod: CPTII,,, | Performed by: NURSE PRACTITIONER

## 2025-06-10 PROCEDURE — 3008F BODY MASS INDEX DOCD: CPT | Mod: CPTII,,, | Performed by: NURSE PRACTITIONER

## 2025-06-10 PROCEDURE — 99213 OFFICE O/P EST LOW 20 MIN: CPT | Mod: ,,, | Performed by: NURSE PRACTITIONER

## 2025-06-10 RX ORDER — HALOPERIDOL 5 MG/1
5 TABLET ORAL NIGHTLY
COMMUNITY
Start: 2025-05-21

## 2025-06-10 RX ORDER — MODAFINIL 200 MG/1
200 TABLET ORAL DAILY
COMMUNITY
Start: 2025-05-21

## 2025-06-18 DIAGNOSIS — M62.838 MUSCLE SPASM: Primary | ICD-10-CM

## 2025-06-18 RX ORDER — CYCLOBENZAPRINE HCL 10 MG
TABLET ORAL
Qty: 90 TABLET | Refills: 1 | Status: SHIPPED | OUTPATIENT
Start: 2025-06-18

## 2025-08-04 NOTE — PROGRESS NOTES
Chief Complaint:  Well Woman     History of Present Illness:  Brenda Humphreys is a 60 y.o. year old  presents for her well woman exam status post [facility] BSO. No vaginal bleeding. No complaints today.    PMH of basal cell carcinoma    MMG 24 birads 0  Dx MMG, L breast US 25 benign    Cancer-related family history includes Breast cancer (age of onset: 56) in her sister; Breast cancer (age of onset: 69) in her mother. There is no history of Ovarian cancer, Uterine cancer, or Cervical cancer.        Gyn History:  Menstrual History  Cycle: No  Menarche Age: 13 years  No Cycle Reason: (!) Surgical  Surgical Reason: hysterectomy    Menopause  Menopause Age: 55 years  Post Menopausal Bleeding: No  Hormone Replacement Therapy: Yes (estrace 1mg)    Pap History  History of Abnormal Pap: No  HPV Vaccine Completed: No (0/3)    [place]  Sexually Active: Yes  Sexual Orientation: heterosexual  Postcoital Bleeding: No  Dyspareunia: No  STI History: No  Contraception: No    Breast History  Last Breast Imaging Date: Yes  Date: 24 (benign)  History of Abnormal Breast Imaging : (!) Yes (L dx MMG/US: 25 negative)  History of Breast Biopsy: No        Review of Systems:  General/Constitutional: Chills denies. Fatigue/weakness denies. Fever denies. Night sweats denies. Hot flashes denies    Respiratory: Cough denies. Hemoptysis denies. SOB denies. Sputum production denies. Wheezing denies .   Cardiovascular: Chest pain denies. Dizziness denies. Palpitations denies. Swelling in hands/feet denies.                Breast: Dimpling denies. Breast mass denies. Breast pain/tenderness denies. Nipple discharge denies. Puckering denies.  Gastrointestinal: Abdominal pain denies. Blood in stool denies. Constipation denies. Diarrhea denies. Heartburn denies. Nausea denies. Vomiting denies    Genitourinary: Incontinence denies. Blood in urine denies. Frequent urination denies. Painful urination denies. Urinary urgency  denies. Nocturia denies    Gynecologic: Irregular bleeding denies. Heavy bleeding denies. Painful menses denies. Vaginal discharge denies. Vaginal odor denies. Vaginal itching denies. Vaginal lesion denies. Pelvic pain denies. Decreased libido denies. Vulvar lesion denies. Prolapse of genital organs denies. Painful intercourse denies. Postcoital bleeding denies    Psychiatric: Depression denies. Anxiety denies.     OB History    Para Term  AB Living   3 3 3 0 0 3   SAB IAB Ectopic Multiple Live Births   0 0 0 0 3      # 1 - Date: [birth year], Sex: Male, Weight: 3.941 kg (8 lb 11 oz), GA: None, Type: Vaginal, Spontaneous, Apgar1: None, Apgar5: None, Living: Living, Birth Comments: None    # 2 - Date: 87, Sex: Male, Weight: 3.515 kg (7 lb 12 oz), GA: None, Type: Vaginal, Spontaneous, Apgar1: None, Apgar5: None, Living: Living, Birth Comments: None    # 3 - Date: [birth year], Sex: Female, Weight: 4.082 kg (9 lb), GA: None, Type: Vaginal, Spontaneous, Apgar1: None, Apgar5: None, Living: Living, Birth Comments: None      Past Medical History:   Diagnosis Date    Basal cell carcinoma of neck, face, and arm     Carpal tunnel syndrome     Chronic gastritis     Decreased estrogen level     Deviated nasal septum     Fatigue     GERD (gastroesophageal reflux disease)     H/O endocrine disorder     Hiatal hernia     Hormone replacement therapy     Hypertrophy of nasal turbinates     Insomnia     Iron deficiency anemia, unspecified     Memory impairment     Metabolic syndrome X     Mixed hyperlipidemia     Narcolepsy     Obstructive sleep apnea     Phonic tic     Prediabetes     Radicular pain     Restless legs     Stress incontinence (female) (male)     Ulcer of nasal septum     Vitamin B12 deficiency     Vitamin D deficiency      Current Medications[1]    Review of patient's allergies indicates:  No Known Allergies    Past Surgical History:   Procedure Laterality Date    APPLICATION, CARTILAGE GRAFT, NASAL SEPTUM  " 09/26/2019    BASAL CELL CARCINOMA EXCISION  03/21/2019    BIOPSY  08/31/2022    right cheek    BUNIONECTOMY      CHOLECYSTECTOMY      COLONOSCOPY  04/17/2018    DILATION AND CURETTAGE OF UTERUS      ESOPHAGOGASTRODUODENOSCOPY  06/03/2021    HYSTERECTOMY, VAGINAL, LAPAROSCOPY-ASSISTED, WITH SALPINGO-OOPHORECTOY  01/29/2018    AUB    LAPAROSCOPIC GASTRIC BANDING      Placement    LAPAROSCOPIC REMOVAL OF GASTRIC BAND  2008     Family History   Problem Relation Name Age of Onset    Breast cancer Mother  69    Bipolar disorder Mother      COPD Father      Breast cancer Sister  56    Bipolar disorder Maternal Aunt      Bipolar disorder Maternal Uncle      Anxiety disorder Other children     Depression Other children     Colon cancer Neg Hx      Ovarian cancer Neg Hx      Uterine cancer Neg Hx      Cervical cancer Neg Hx       Social History[2]    Physical Exam:  /84 (BP Location: Right arm, Patient Position: Sitting)   Ht 5' 2" (1.575 m)   Wt 104.8 kg (231 lb)   LMP  (LMP Unknown)   BMI 42.25 kg/m²     Chaperone: present.       General appearance: healthy, well-nourished and well-developed     Psychiatric: Orientation to time, place and person. Normal mood and affect and active, alert     Skin: Appearance: no rashes or lesions.     Neck:   Neck: supple, FROM, trachea midline. and no masses   Thyroid: no enlargement or nodules and non-tender.       Cardiovascular:   Auscultation: RRR and no murmur.   Peripheral Vascular: no varicosities, LLE edema, RLE edema, calf tenderness, and palpable cord and pedal pulses intact.     Lungs:   Respiratory effort: no intercostal retractions or accessory muscle usage.   Auscultation: no wheezing, rales/crackles, or rhonchi and clear to auscultation.     Breast:   Inspection/Palpation: no tenderness, discrete/distinct masses, skin changes, or abnormal secretions. Nipple appearance normal.     Abdomen:   Auscultation/Inspection/Palpation: no hepatomegaly, splenomegaly, masses, " tenderness or CVA tenderness and soft, non-distended bowel sounds preset.    Hernia: no palpable hernias.     Female Genitalia:    Vulva: no masses, tenderness or lesions    Bladder/Urethra: no urethral discharge or mass, normal meatus, bladder non-distended.    Vagina: no tenderness, erythema, cystocele, rectocele, abnormal vaginal discharge or vesicle(s) or ulcers    Cuff intact, no masses, NT   Adnexa/Parametria: no parametrial tenderness or mass, no adnexal tenderness or ovarian mass.     Lymph Nodes:   Palpation: non tender submandibular nodes, axillary nodes, or inguinal nodes.     Rectal Exam:   Rectum: normal perianal skin.       Assessment/Plan:  1. Screening mammogram for breast cancer  -     Mammo Digital Screening Bilat w/ Robin (XPD); Future; Expected date: 11/14/2025    2. Routine gynecological examination    3. Hormone replacement therapy (HRT)       PAP not done - s/p Hyst  Reviewed calcium needs, exercise, and prevention of osteoporosis.  Healthy diet  Annual MMG  Discussed breast self-awareness  Colonoscopy q 10 yrs  Reviewed normal menopause and menopausal symptoms  Strongly encouraged Shingles vaccination  RTC 1 yr     - Reviewed the physiologic roles of estrogen, progesterone and androgens in the female both positive and negative.     - Explained that with menopause comes a dramatic reduction in these hormones and that changes take place in their absence.     - Discussed symptoms of decreased hormone levels and that these symptoms usually last 6 months to 2 years.     - Reviewed hormone replacement options as well as indications and contraindications.     - Discussed the WHI study findings and current FDA recommendations. Also discussed current recommendations for breast screening.     - Reviewed precautions when taking female hormones and symptoms to be aware of such as headache, visual change, focal weakness or numbness, SOB,chest pain, pain in thigh or calf, breast pain or lump, and vaginal  bleeding. Instructed to call immediately and stop HRT if any of these symptoms occur.     -Advised patient of increased risk of stroke, heart attack, and blood clots.   Renew estradiol 1mg daily             [1]   Current Outpatient Medications:     cholestyramine (QUESTRAN) 4 gram packet, Take 1 packet by mouth 2 (two) times a day., Disp: , Rfl:     clonazePAM (KLONOPIN) 0.5 MG tablet, Take 1 tablet (0.5 mg total) by mouth 2 (two) times daily as needed for Anxiety for 14 days, THEN 1 tablet (0.5 mg total) daily as needed for Anxiety. (Patient taking differently: 0.5 mg once a day), Disp: 35 tablet, Rfl: 0    cloNIDine (CATAPRES) 0.1 MG tablet, Take 1 tablet (0.1 mg total) by mouth every evening., Disp: 30 tablet, Rfl: 2    cyclobenzaprine (FLEXERIL) 10 MG tablet, TAKE ONE(1) TAB BY MOUTH 3 TIMES DAILY AS NEEDED FOR MUSCLE SPASMS &/OR PAIN, Disp: 90 tablet, Rfl: 1    ergocalciferol (ERGOCALCIFEROL) 50,000 unit Cap, TAKE ONE(1) CAP BY MOUTH ONCE A WEEK ON THE SAME DAY EACH WEEK FOR VITAMIN D /DO NOT CRUSH OR CHEW, Disp: 13 capsule, Rfl: 1    esomeprazole (NEXIUM) 20 MG capsule, TAKE ONE(1) CAP BY MOUTH ONCE DAY ON EMPTY STOMACH FOR STOMACH &/OR REFLUX /DO NOT CRUSH OR CHEW, Disp: 90 capsule, Rfl: 1    estradioL (ESTRACE) 1 MG tablet, Take 1 tablet (1 mg total) by mouth once daily., Disp: 30 tablet, Rfl: 11    gabapentin (NEURONTIN) 100 MG capsule, TAKE ONE(1) CAP BY MOUTH EVERY DAY AT 4PM & TAKE TWO(2) CAPS BY MOUTH EVERY NIGHT AT BEDTIME AS NEEDED FOR RESTLESS LEGS, Disp: 270 capsule, Rfl: 1    haloperidoL (HALDOL) 5 MG tablet, Take 5 mg by mouth every evening., Disp: , Rfl:     hydrOXYzine (ATARAX) 50 MG tablet, Take 50 mg by mouth every evening., Disp: , Rfl:     levocetirizine (XYZAL) 5 MG tablet, Take 1 tablet (5 mg total) by mouth every evening., Disp: 90 tablet, Rfl: 1    levothyroxine (SYNTHROID) 88 MCG tablet, Take 1 tablet (88 mcg total) by mouth before breakfast., Disp: 90 tablet, Rfl: 1    meloxicam  (MOBIC) 15 MG tablet, TAKE ONE(1) TAB BY MOUTH ONCE A DAY WITH FULL GLASS OF WATER & FOOD FOR PAIN, INFLAMMATION, &/OR ARTHRITIS, Disp: 30 tablet, Rfl: 4    modafiniL (PROVIGIL) 200 MG Tab, Take 200 mg by mouth once daily., Disp: , Rfl:     mometasone 0.1% (ELOCON) 0.1 % cream, Apply 1 application topically 2 (two) times daily as needed., Disp: , Rfl:     montelukast (SINGULAIR) 10 mg tablet, TAKE ONE(1) TAB BY MOUTH EVERY NIGHT AT BEDTIME FOR ALLERGIES, Disp: 90 tablet, Rfl: 0    rOPINIRole (REQUIP) 2 MG tablet, TAKE ONE(1) TAB BY MOUTH EVERY NIGHT AT BEDTIME FOR RESTLESS LEGS, Disp: 90 tablet, Rfl: 0    tirzepatide (MOUNJARO) 15 mg/0.5 mL PnIj, Inject 15 mg into the skin daily as needed (ppain)., Disp: 2 mL, Rfl: 5    traZODone (DESYREL) 150 MG tablet, Take 2 tablets (300 mg total) by mouth every evening., Disp: 60 tablet, Rfl: 2    TRINTELLIX 20 mg Tab, Take 1 tablet (20 mg total) by mouth once daily., Disp: 30 tablet, Rfl: 2  [2]   Social History  Socioeconomic History    Marital status:      Spouse name: Roberto Carlos Humphreys    Number of children: 3   Tobacco Use    Smoking status: Never     Passive exposure: Never    Smokeless tobacco: Never   Substance and Sexual Activity    Alcohol use: Yes    Drug use: Never    Sexual activity: Yes     Partners: Male     Birth control/protection: See Surgical Hx     Comment: s/p JUAN R w/ BS&O     Social Drivers of Health     Financial Resource Strain: Low Risk  (3/28/2023)    Overall Financial Resource Strain (CARDIA)     Difficulty of Paying Living Expenses: Not hard at all   Food Insecurity: No Food Insecurity (3/28/2023)    Hunger Vital Sign     Worried About Running Out of Food in the Last Year: Never true     Ran Out of Food in the Last Year: Never true   Transportation Needs: No Transportation Needs (3/28/2023)    PRAPARE - Transportation     Lack of Transportation (Medical): No     Lack of Transportation (Non-Medical): No   Physical Activity: Inactive (3/28/2023)     Exercise Vital Sign     Days of Exercise per Week: 0 days     Minutes of Exercise per Session: 0 min   Housing Stability: Low Risk  (3/28/2023)    Housing Stability Vital Sign     Unable to Pay for Housing in the Last Year: No     Number of Places Lived in the Last Year: 1     Unstable Housing in the Last Year: No

## 2025-08-06 ENCOUNTER — OFFICE VISIT (OUTPATIENT)
Dept: OBSTETRICS AND GYNECOLOGY | Facility: CLINIC | Age: 60
End: 2025-08-06
Payer: COMMERCIAL

## 2025-08-06 VITALS
DIASTOLIC BLOOD PRESSURE: 84 MMHG | HEIGHT: 62 IN | SYSTOLIC BLOOD PRESSURE: 128 MMHG | WEIGHT: 231 LBS | BODY MASS INDEX: 42.51 KG/M2

## 2025-08-06 DIAGNOSIS — Z12.31 SCREENING MAMMOGRAM FOR BREAST CANCER: Primary | ICD-10-CM

## 2025-08-06 DIAGNOSIS — Z01.419 ROUTINE GYNECOLOGICAL EXAMINATION: ICD-10-CM

## 2025-08-06 DIAGNOSIS — Z79.890 HORMONE REPLACEMENT THERAPY (HRT): ICD-10-CM

## 2025-08-06 RX ORDER — ESTRADIOL 1 MG/1
1 TABLET ORAL DAILY
Qty: 90 TABLET | Refills: 3 | Status: SHIPPED | OUTPATIENT
Start: 2025-08-06 | End: 2026-08-06

## 2025-08-20 DIAGNOSIS — K21.9 GASTROESOPHAGEAL REFLUX DISEASE, UNSPECIFIED WHETHER ESOPHAGITIS PRESENT: ICD-10-CM

## 2025-08-20 RX ORDER — HYDROGEN PEROXIDE 3 %
SOLUTION, NON-ORAL MISCELLANEOUS
Qty: 90 CAPSULE | Refills: 1 | Status: SHIPPED | OUTPATIENT
Start: 2025-08-20

## 2025-09-02 DIAGNOSIS — G25.81 RESTLESS LEG SYNDROME: ICD-10-CM

## 2025-09-02 RX ORDER — ROPINIROLE 2 MG/1
TABLET, FILM COATED ORAL
Qty: 90 TABLET | Refills: 0 | Status: SHIPPED | OUTPATIENT
Start: 2025-09-02